# Patient Record
Sex: FEMALE | Race: WHITE | NOT HISPANIC OR LATINO | Employment: FULL TIME | URBAN - METROPOLITAN AREA
[De-identification: names, ages, dates, MRNs, and addresses within clinical notes are randomized per-mention and may not be internally consistent; named-entity substitution may affect disease eponyms.]

---

## 2017-01-16 ENCOUNTER — OFFICE VISIT (OUTPATIENT)
Dept: URGENT CARE | Facility: CLINIC | Age: 32
End: 2017-01-16
Payer: COMMERCIAL

## 2017-01-16 VITALS
BODY MASS INDEX: 23.32 KG/M2 | WEIGHT: 140 LBS | HEIGHT: 65 IN | TEMPERATURE: 98 F | SYSTOLIC BLOOD PRESSURE: 120 MMHG | RESPIRATION RATE: 12 BRPM | OXYGEN SATURATION: 98 % | DIASTOLIC BLOOD PRESSURE: 82 MMHG | HEART RATE: 112 BPM

## 2017-01-16 DIAGNOSIS — J02.9 ACUTE PHARYNGITIS, UNSPECIFIED ETIOLOGY: ICD-10-CM

## 2017-01-16 LAB
FLUAV+FLUBV AG SPEC QL IA: NORMAL
INT CON NEG: NEGATIVE
INT CON NEG: NEGATIVE
INT CON POS: POSITIVE
INT CON POS: POSITIVE
S PYO AG THROAT QL: NORMAL

## 2017-01-16 PROCEDURE — 87804 INFLUENZA ASSAY W/OPTIC: CPT | Performed by: NURSE PRACTITIONER

## 2017-01-16 PROCEDURE — 99214 OFFICE O/P EST MOD 30 MIN: CPT | Performed by: NURSE PRACTITIONER

## 2017-01-16 PROCEDURE — 87880 STREP A ASSAY W/OPTIC: CPT | Performed by: NURSE PRACTITIONER

## 2017-01-16 RX ORDER — AMOXICILLIN 500 MG/1
500 CAPSULE ORAL 3 TIMES DAILY
Qty: 30 CAP | Refills: 0 | Status: SHIPPED | OUTPATIENT
Start: 2017-01-16 | End: 2019-10-02

## 2017-01-16 ASSESSMENT — ENCOUNTER SYMPTOMS
WEAKNESS: 1
DIARRHEA: 0
HEADACHES: 1
MYALGIAS: 1
SPUTUM PRODUCTION: 0
COUGH: 0
TROUBLE SWALLOWING: 1
SORE THROAT: 1
NAUSEA: 0
CHILLS: 0
FEVER: 1
SHORTNESS OF BREATH: 0
SWOLLEN GLANDS: 1
VOMITING: 0

## 2017-01-16 NOTE — PROGRESS NOTES
"Subjective:      Malia Delgado is a 31 y.o. female who presents with Headache            HPI Comments: Medications, Allergies and Prior Medical Hx reviewed and updated in [facility].with patient/family today         Pharyngitis   This is a new problem. The current episode started yesterday. The problem has been unchanged. The maximum temperature recorded prior to her arrival was 100.4 - 100.9 F. The pain is at a severity of 5/10. Associated symptoms include headaches, swollen glands and trouble swallowing. Pertinent negatives include no congestion, coughing, diarrhea, ear discharge, ear pain, shortness of breath or vomiting. She has had no exposure to strep. She has tried NSAIDs for the symptoms. The treatment provided no relief.       Review of Systems   Constitutional: Positive for fever and malaise/fatigue. Negative for chills.   HENT: Positive for sore throat and trouble swallowing. Negative for congestion, ear discharge and ear pain.    Respiratory: Negative for cough, sputum production and shortness of breath.    Gastrointestinal: Negative for nausea, vomiting and diarrhea.   Musculoskeletal: Positive for myalgias.   Neurological: Positive for weakness and headaches.          Objective:     /82 mmHg  Pulse 112  Temp(Src) 36.7 °C (98 °F)  Resp 12  Ht 1.651 m (5' 5\")  Wt 63.504 kg (140 lb)  BMI 23.30 kg/m2  SpO2 98%  Breastfeeding? No     Physical Exam   Constitutional: She appears well-developed and well-nourished. No distress.   HENT:   Head: Normocephalic and atraumatic.   Right Ear: Tympanic membrane and ear canal normal.   Left Ear: Tympanic membrane and ear canal normal.   Nose: No rhinorrhea.   Mouth/Throat: Uvula is midline and mucous membranes are normal. Posterior oropharyngeal edema and posterior oropharyngeal erythema present. No oropharyngeal exudate.   Eyes: Conjunctivae are normal. Pupils are equal, round, and reactive to light.   Neck: Neck supple.   Cardiovascular: Normal rate, regular " rhythm and normal heart sounds.    Pulmonary/Chest: Effort normal and breath sounds normal. No respiratory distress. She has no wheezes.   Lymphadenopathy:     She has cervical adenopathy.   Neurological: She is alert.   Skin: Skin is warm and dry.   Psychiatric: She has a normal mood and affect. Her behavior is normal.   Vitals reviewed.              Assessment/Plan:     1. Acute pharyngitis, unspecified etiology  POCT Influenza A/B    POCT Rapid Strep A    amoxicillin (AMOXIL) 500 MG Cap       poct flu - neg  poct strep - neg    Discussed with the patient that her symptoms are compatible with strep, stated that antibiotics would be a reasonable choice would could not guarantee that they would be effective. She states she would like to try antibiotics.    Rest, Fluids, tylenol, ibuprofen, otc throat lozenges, gargle with warm salt water,   Pt will go to the ER for worsening or changing symptoms as discussed,  Follow-up with your primary care provider or return here if not improving in 5 days   Discharge instructions discussed with pt/family who verbalize understanding and agreement with poc

## 2017-01-16 NOTE — PATIENT INSTRUCTIONS

## 2017-01-16 NOTE — MR AVS SNAPSHOT
"        Malia Sandy   2017 1:00 PM   Office Visit   MRN: [id number]    Department:  [facility]   Dept Phone:  [phone]    Description:  Female : 1985   Provider:  YESY Esposito           Reason for Visit     Headache off and on, sore throat, sinus pressure, fatigue, body aches       Allergies as of 2017     No Known Allergies      You were diagnosed with     Acute pharyngitis, unspecified etiology   [[id number]]         Vital Signs     Blood Pressure Pulse Temperature Respirations Height Weight    120/82 mmHg 112 36.7 °C (98 °F) 12 1.651 m (5' 5\") 63.504 kg (140 lb)    Body Mass Index Oxygen Saturation Breastfeeding? Smoking Status          23.30 kg/m2 98% No Never Smoker         Basic Information     Date Of Birth Sex Race Ethnicity Preferred Language    1985 Female White Non- English      Health Maintenance        Date Due Completion Dates    IMM DTaP/Tdap/Td Vaccine (1 - Tdap) 2006 ---    PAP SMEAR 2008 ---    IMM INFLUENZA (1) 2016 ---            Current Immunizations     No immunizations on file.      Below and/or attached are the medications your provider expects you to take. Review all of your home medications and newly ordered medications with your provider and/or pharmacist. Follow medication instructions as directed by your provider and/or pharmacist. Please keep your medication list with you and share with your provider. Update the information when medications are discontinued, doses are changed, or new medications (including over-the-counter products) are added; and carry medication information at all times in the event of emergency situations     Allergies:  No Known Allergies          Medications  Valid as of: 2017 -  1:38 PM    Generic Name Brand Name Tablet Size Instructions for use    Amoxicillin (Cap) AMOXIL 500 MG Take 1 Cap by mouth 3 times a day.        Amoxicillin-Pot Clavulanate (Tab) AUGMENTIN 875-125 MG Take 1 Tab by " mouth 2 times a day.        Amoxicillin-Pot Clavulanate (Tab) AUGMENTIN 875-125 MG Take 1 Tab by mouth 2 times a day.        .                 Medicines prescribed today were sent to:     [facility] [place], [place] - [address]    [address] [place] [address]    Phone: [phone] Fax: [phone]    Open 24 Hours?: No      Medication refill instructions:       If your prescription bottle indicates you have medication refills left, it is not necessary to call your provider’s office. Please contact your pharmacy and they will refill your medication.    If your prescription bottle indicates you do not have any refills left, you may request refills at any time through one of the following ways: The online Pharmap[employer] system (except Urgent Care), by calling your provider’s office, or by asking your pharmacy to contact your provider’s office with a refill request. Medication refills are processed only during regular business hours and may not be available until the next business day. Your provider may request additional information or to have a follow-up visit with you prior to refilling your medication.   *Please Note: Medication refills are assigned a new Rx number when refilled electronically. Your pharmacy may indicate that no refills were authorized even though a new prescription for the same medication is available at the pharmacy. Please request the medicine by name with the pharmacy before contacting your provider for a refill.        Instructions    Pharyngitis  Pharyngitis is redness, pain, and swelling (inflammation) of your pharynx.   CAUSES   Pharyngitis is usually caused by infection. Most of the time, these infections are from viruses (viral) and are part of a cold. However, sometimes pharyngitis is caused by bacteria (bacterial). Pharyngitis can also be caused by allergies. Viral pharyngitis may be spread from person to person by coughing, sneezing, and personal items or utensils  (cups, forks, spoons, toothbrushes). Bacterial pharyngitis may be spread from person to person by more intimate contact, such as kissing.   SIGNS AND SYMPTOMS   Symptoms of pharyngitis include:    · Sore throat.    · Tiredness (fatigue).    · Low-grade fever.    · Headache.  · Joint pain and muscle aches.  · Skin rashes.  · Swollen lymph nodes.  · Plaque-like film on throat or tonsils (often seen with bacterial pharyngitis).  DIAGNOSIS   Your health care provider will ask you questions about your illness and your symptoms. Your medical history, along with a physical exam, is often all that is needed to diagnose pharyngitis. Sometimes, a rapid strep test is done. Other lab tests may also be done, depending on the suspected cause.   TREATMENT   Viral pharyngitis will usually get better in 3-4 days without the use of medicine. Bacterial pharyngitis is treated with medicines that kill germs (antibiotics).   HOME CARE INSTRUCTIONS   · Drink enough water and fluids to keep your urine clear or pale yellow.    · Only take over-the-counter or prescription medicines as directed by your health care provider:    ¨ If you are prescribed antibiotics, make sure you finish them even if you start to feel better.    ¨ Do not take aspirin.    · Get lots of rest.    · Gargle with 8 oz of salt water (½ tsp of salt per 1 qt of water) as often as every 1-2 hours to soothe your throat.    · Throat lozenges (if you are not at risk for choking) or sprays may be used to soothe your throat.  SEEK MEDICAL CARE IF:   · You have large, tender lumps in your neck.  · You have a rash.  · You cough up green, yellow-brown, or bloody spit.  SEEK IMMEDIATE MEDICAL CARE IF:   · Your neck becomes stiff.  · You drool or are unable to swallow liquids.  · You vomit or are unable to keep medicines or liquids down.  · You have severe pain that does not go away with the use of recommended medicines.  · You have trouble breathing (not caused by a stuffy  nose).  MAKE SURE YOU:   · Understand these instructions.  · Will watch your condition.  · Will get help right away if you are not doing well or get worse.     This information is not intended to replace advice given to you by your health care provider. Make sure you discuss any questions you have with your health care provider.     Document Released: 12/18/2006 Document Revised: 10/08/2014 Document Reviewed: 08/25/2014  [employer] Interactive Patient Education ©2016 [employer] Inc.            "[employer] Access Code: [email or web address]  Expires: 2/15/2017  1:38 PM    Your email address is not on file at [facility].  Email Addresses are required for you to sign up for "[employer] please contact [phone] to verify your personal information and to provide your email address prior to attempting to register for "[employer].    Malia Delgado  [address] [place] [address]    "[employer]  A secure, online tool to manage your health information     [employer]’s "[employer]® is a secure, online tool that connects you to your personalized health information from the privacy of your home -- day or night - making it very easy for you to manage your healthcare. Once the activation process is completed, you can even access your medical information using the "[employer] ayesha, which is available for free in the Apple Ayesha store or Google Play store.     To learn more about "[employer] visit [email or web address]/"[employer]    There are two levels of access available (as shown below):   My Chart Features  [facility] Primary Care Doctor [facility]  Specialists [facility]  Urgent  Care Non-[facility] Primary Care Doctor   Email your healthcare team securely and privately 24/7 X X X    Manage appointments: schedule your next appointment; view details of past/upcoming appointments X      Request prescription refills. X      View recent personal medical records, including lab and immunizations X X X X   View health record, including health history, allergies, medications X  X X X   Read reports about your outpatient visits, procedures, consult and ER notes X X X X   See your discharge summary, which is a recap of your hospital and/or ER visit that includes your diagnosis, lab results, and care plan X X  X     How to register for [employer]:  Once your e-mail address has been verified, follow the following steps to sign up for [employer].     1. Go to  [email or web address]  2. Click on the Sign Up Now box, which takes you to the New Member Sign Up page. You will need to provide the following information:  a. Enter your [employer] Access Code exactly as it appears at the top of this page. (You will not need to use this code after you’ve completed the sign-up process. If you do not sign up before the expiration date, you must request a new code.)   b. Enter your date of birth.   c. Enter your home email address.   d. Click Submit, and follow the next screen’s instructions.  3. Create a [employer] ID. This will be your [employer] login ID and cannot be changed, so think of one that is secure and easy to remember.  4. Create a [employer] password. You can change your password at any time.  5. Enter your Password Reset Question and Answer. This can be used at a later time if you forget your password.   6. Enter your e-mail address. This allows you to receive e-mail notifications when new information is available in [employer].  7. Click Sign Up. You can now view your health information.    For assistance activating your [employer] account, call [phone]

## 2017-01-16 NOTE — Clinical Note
January 16, 2017       Patient: Malia Delgado   YOB: 1985   Date of Visit: 1/16/2017         To Whom It May Concern:    It is my medical opinion that Malia Delgado should be off work for 2-3 days due to illness.     If you have any questions or concerns, please don't hesitate to call [phone]          Sincerely,          HALIE Esposito.  Electronically Signed

## 2017-01-22 ENCOUNTER — OFFICE VISIT (OUTPATIENT)
Dept: URGENT CARE | Facility: CLINIC | Age: 32
End: 2017-01-22
Payer: COMMERCIAL

## 2017-01-22 VITALS
HEIGHT: 65 IN | WEIGHT: 152 LBS | RESPIRATION RATE: 16 BRPM | BODY MASS INDEX: 25.33 KG/M2 | DIASTOLIC BLOOD PRESSURE: 76 MMHG | HEART RATE: 96 BPM | SYSTOLIC BLOOD PRESSURE: 122 MMHG | OXYGEN SATURATION: 97 % | TEMPERATURE: 99 F

## 2017-01-22 DIAGNOSIS — B30.9 ACUTE VIRAL CONJUNCTIVITIS OF LEFT EYE: ICD-10-CM

## 2017-01-22 DIAGNOSIS — M79.671 FOOT PAIN, RIGHT: ICD-10-CM

## 2017-01-22 PROCEDURE — 99214 OFFICE O/P EST MOD 30 MIN: CPT | Performed by: PHYSICIAN ASSISTANT

## 2017-01-22 RX ORDER — POLYMYXIN B SULFATE AND TRIMETHOPRIM 1; 10000 MG/ML; [USP'U]/ML
2 SOLUTION OPHTHALMIC EVERY 4 HOURS
Qty: 1 BOTTLE | Refills: 0 | Status: SHIPPED | OUTPATIENT
Start: 2017-01-22 | End: 2017-01-27

## 2017-01-22 ASSESSMENT — ENCOUNTER SYMPTOMS
MYALGIAS: 0
EYE PAIN: 1
COUGH: 0
EYE REDNESS: 1
VOMITING: 0
FEVER: 0
PHOTOPHOBIA: 0
EYE DISCHARGE: 1
HEADACHES: 0
DIZZINESS: 0
CHILLS: 0
DIARRHEA: 0
BLURRED VISION: 1

## 2017-01-22 NOTE — PROGRESS NOTES
"Subjective:      Malia Delgado is a 31 y.o. female who presents with Foot Problem and Eye Problem    Current medications reviewed.  No past medical history on file.  Social History   Substance Use Topics   • Smoking status: Never Smoker    • Smokeless tobacco: Not on file   • Alcohol Use: Not on file     Family History Reviewed: noncontributory      Left eye became red with drainage in middle of night, denies blurred vision.     3 months with intermittent right foot pain at MCP joint with enlargement in this area.    Foot Problem  Pertinent negatives include no chills, congestion, coughing, fever, headaches, myalgias, rash or vomiting.   Eye Problem   Associated symptoms include blurred vision, an eye discharge and eye redness. Pertinent negatives include no fever, photophobia or vomiting.       Review of Systems   Constitutional: Negative for fever, chills and malaise/fatigue.   HENT: Negative for congestion and ear pain.    Eyes: Positive for blurred vision, pain, discharge and redness. Negative for photophobia.   Respiratory: Negative for cough.    Gastrointestinal: Negative for vomiting and diarrhea.   Musculoskeletal: Negative for myalgias.        Chronic right foot pain distal lateral   Skin: Negative for rash.   Neurological: Negative for dizziness and headaches.   All other systems reviewed and are negative.         Objective:     /76 mmHg  Pulse 96  Temp(Src) 37.2 °C (99 °F)  Resp 16  Ht 1.651 m (5' 5\")  Wt 68.947 kg (152 lb)  BMI 25.29 kg/m2  SpO2 97%     Physical Exam   Constitutional: She is oriented to person, place, and time. She appears well-developed and well-nourished.   HENT:   Right Ear: Tympanic membrane and ear canal normal.   Left Ear: Tympanic membrane and ear canal normal.   Nose: Mucosal edema present.   Eyes: EOM and lids are normal. Pupils are equal, round, and reactive to light. Right eye exhibits discharge. Left eye exhibits discharge. Right conjunctiva is injected. Left " conjunctiva is injected.   Cardiovascular: Normal rate and regular rhythm.    Pulmonary/Chest: Effort normal and breath sounds normal.   Musculoskeletal:        Right foot: There is tenderness. There is normal range of motion, no bony tenderness, no swelling, normal capillary refill, no crepitus, no deformity and no laceration.        Feet:    Mild callus formation on dermis with enlargement of dermis and possible enlargement of bony structure at distal fifth metatarsal.  No erythema, warmth, laceration, ecchymosis, fluctuation noted.   Lymphadenopathy:     She has no cervical adenopathy.   Neurological: She is alert and oriented to person, place, and time.   Skin: Skin is warm and dry.   Nursing note and vitals reviewed.              Assessment/Plan:     1. Foot pain, right  polymixin-trimethoprim (POLYTRIM) 70541-6.1 UNIT/ML-% Solution    REFERRAL TO PODIATRY   2. Acute viral conjunctivitis of left eye  polymixin-trimethoprim (POLYTRIM) 95557-7.1 UNIT/ML-% Solution    REFERRAL TO PODIATRY     Podiatry referral placed to have right foot evaluated. 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  Wash hands before and after touching eyes.  Use medication for 5-7 days  Follow up for failure of sx to resolve or with any questions or concerns.  Patient verbalized understanding of and agreed with plan of care.

## 2017-01-22 NOTE — MR AVS SNAPSHOT
"        Malia Acevedol   2017 12:30 PM   Office Visit   MRN: [id number]    Department:  [facility]   Dept Phone:  [phone]    Description:  Female : 1985   Provider:  Ermelinda Miranda PA-C           Reason for Visit     Foot Problem Right foot lump/base of fith metatarsal x3 months     Eye Problem Left eye redness/drainage x1 day       Allergies as of 2017     No Known Allergies      You were diagnosed with     Foot pain, right   [[id number]]       Acute viral conjunctivitis of left eye   [[id number]]         Vital Signs     Blood Pressure Pulse Temperature Respirations Height Weight    122/76 mmHg 96 37.2 °C (99 °F) 16 1.651 m (5' 5\") 68.947 kg (152 lb)    Body Mass Index Oxygen Saturation Smoking Status             25.29 kg/m2 97% Never Smoker          Basic Information     Date Of Birth Sex Race Ethnicity Preferred Language    1985 Female White Non- English      Health Maintenance        Date Due Completion Dates    IMM DTaP/Tdap/Td Vaccine (1 - Tdap) 2006 ---    PAP SMEAR 2008 ---    IMM INFLUENZA (1) 2016 ---            Current Immunizations     No immunizations on file.      Below and/or attached are the medications your provider expects you to take. Review all of your home medications and newly ordered medications with your provider and/or pharmacist. Follow medication instructions as directed by your provider and/or pharmacist. Please keep your medication list with you and share with your provider. Update the information when medications are discontinued, doses are changed, or new medications (including over-the-counter products) are added; and carry medication information at all times in the event of emergency situations     Allergies:  No Known Allergies          Medications  Valid as of: 2017 -  3:35 PM    Generic Name Brand Name Tablet Size Instructions for use    Amoxicillin (Cap) AMOXIL 500 MG Take 1 Cap by mouth 3 times a day.       " Amoxicillin-Pot Clavulanate (Tab) AUGMENTIN 875-125 MG Take 1 Tab by mouth 2 times a day.        Amoxicillin-Pot Clavulanate (Tab) AUGMENTIN 875-125 MG Take 1 Tab by mouth 2 times a day.        Polymyxin B-Trimethoprim (Solution) POLYTRIM 47403-5.1 UNIT/ML-% Place 2 Drops in both eyes every 4 hours for 5 days.        .                 Medicines prescribed today were sent to:     [facility] [address] [place] [address], [place] - [address]    [address] [place] [address]    Phone: [phone] Fax: [phone]    Open 24 Hours?: No      Medication refill instructions:       If your prescription bottle indicates you have medication refills left, it is not necessary to call your provider’s office. Please contact your pharmacy and they will refill your medication.    If your prescription bottle indicates you do not have any refills left, you may request refills at any time through one of the following ways: The online [employer] system (except Urgent Care), by calling your provider’s office, or by asking your pharmacy to contact your provider’s office with a refill request. Medication refills are processed only during regular business hours and may not be available until the next business day. Your provider may request additional information or to have a follow-up visit with you prior to refilling your medication.   *Please Note: Medication refills are assigned a new Rx number when refilled electronically. Your pharmacy may indicate that no refills were authorized even though a new prescription for the same medication is available at the pharmacy. Please request the medicine by name with the pharmacy before contacting your provider for a refill.        Referral     A referral request has been sent to our patient care coordination department. Please allow 3-5 business days for us to process this request and contact you either by phone or mail. If you do not hear from us by the 5th business day, please call us at  [phone].           Silent Communication Access Code: [email or web address]  Expires: 2/15/2017  1:38 PM    Your email address is not on file at [facility].  Email Addresses are required for you to sign up for Silent Communication, please contact [phone] to verify your personal information and to provide your email address prior to attempting to register for Silent Communication.    Malia Delgado  [address] [place] [address], [place] [address]    Silent Communication  A secure, online tool to manage your health information     [facility]’s Silent Communication® is a secure, online tool that connects you to your personalized health information from the privacy of your home -- day or night - making it very easy for you to manage your healthcare. Once the activation process is completed, you can even access your medical information using the Silent Communication ayesha, which is available for free in the Apple Ayesha store or Google Play store.     To learn more about Silent Communication, visit [email or web address]    There are two levels of access available (as shown below):   My Chart Features  [facility] Primary Care Doctor [facility]  Specialists [facility]  Urgent  Care Non-[facility] Primary Care Doctor   Email your healthcare team securely and privately 24/7 X X X    Manage appointments: schedule your next appointment; view details of past/upcoming appointments X      Request prescription refills. X      View recent personal medical records, including lab and immunizations X X X X   View health record, including health history, allergies, medications X X X X   Read reports about your outpatient visits, procedures, consult and ER notes X X X X   See your discharge summary, which is a recap of your hospital and/or ER visit that includes your diagnosis, lab results, and care plan X X  X     How to register for Silent Communication:  Once your e-mail address has been verified, follow the following steps to sign up for Silent Communication.     1. Go to  [email or web address]  2. Click on the Sign Up Now box, which takes you to the New  Member Sign Up page. You will need to provide the following information:  a. Enter your [employer] Access Code exactly as it appears at the top of this page. (You will not need to use this code after you’ve completed the sign-up process. If you do not sign up before the expiration date, you must request a new code.)   b. Enter your date of birth.   c. Enter your home email address.   d. Click Submit, and follow the next screen’s instructions.  3. Create a Identifiedt ID. This will be your [employer] login ID and cannot be changed, so think of one that is secure and easy to remember.  4. Create a [employer] password. You can change your password at any time.  5. Enter your Password Reset Question and Answer. This can be used at a later time if you forget your password.   6. Enter your e-mail address. This allows you to receive e-mail notifications when new information is available in [employer].  7. Click Sign Up. You can now view your health information.    For assistance activating your [employer] account, call [phone]

## 2017-01-22 NOTE — Clinical Note
January 22, 2017         Patient: Malia Delgado   YOB: 1985   Date of Visit: 1/22/2017           To Whom it May Concern:    Malia Delgado was seen in my clinic on 1/22/2017. She has pink eye and may need to work from home the beginning of this week.    If you have any questions or concerns, please don't hesitate to call.        Sincerely,           Ermelinda Miranda PA-C  Electronically Signed

## 2017-10-20 ENCOUNTER — HOSPITAL ENCOUNTER (OUTPATIENT)
Facility: MEDICAL CENTER | Age: 32
End: 2017-10-20
Payer: COMMERCIAL

## 2017-10-20 LAB
ALBUMIN SERPL BCP-MCNC: 4.4 G/DL (ref 3.2–4.9)
ALBUMIN/GLOB SERPL: 1.8 G/DL
ALP SERPL-CCNC: 49 U/L (ref 30–99)
ALT SERPL-CCNC: 15 U/L (ref 2–50)
ANION GAP SERPL CALC-SCNC: 6 MMOL/L (ref 0–11.9)
AST SERPL-CCNC: 18 U/L (ref 12–45)
BILIRUB SERPL-MCNC: 0.5 MG/DL (ref 0.1–1.5)
BUN SERPL-MCNC: 10 MG/DL (ref 8–22)
CALCIUM SERPL-MCNC: 9.4 MG/DL (ref 8.5–10.5)
CHLORIDE SERPL-SCNC: 105 MMOL/L (ref 96–112)
CHOLEST SERPL-MCNC: 142 MG/DL (ref 100–199)
CO2 SERPL-SCNC: 24 MMOL/L (ref 20–33)
CREAT SERPL-MCNC: 0.83 MG/DL (ref 0.5–1.4)
GFR SERPL CREATININE-BSD FRML MDRD: >60 ML/MIN/1.73 M 2
GLOBULIN SER CALC-MCNC: 2.4 G/DL (ref 1.9–3.5)
GLUCOSE SERPL-MCNC: 86 MG/DL (ref 65–99)
HDLC SERPL-MCNC: 57 MG/DL
LDLC SERPL CALC-MCNC: 76 MG/DL
POTASSIUM SERPL-SCNC: 4.1 MMOL/L (ref 3.6–5.5)
PROT SERPL-MCNC: 6.8 G/DL (ref 6–8.2)
SODIUM SERPL-SCNC: 135 MMOL/L (ref 135–145)
TRIGL SERPL-MCNC: 47 MG/DL (ref 0–149)

## 2018-05-11 ENCOUNTER — OFFICE VISIT (OUTPATIENT)
Dept: URGENT CARE | Facility: CLINIC | Age: 33
End: 2018-05-11
Payer: COMMERCIAL

## 2018-05-11 ENCOUNTER — HOSPITAL ENCOUNTER (OUTPATIENT)
Dept: RADIOLOGY | Facility: MEDICAL CENTER | Age: 33
End: 2018-05-11
Attending: PHYSICIAN ASSISTANT
Payer: COMMERCIAL

## 2018-05-11 VITALS
OXYGEN SATURATION: 97 % | BODY MASS INDEX: 25.62 KG/M2 | HEIGHT: 65 IN | TEMPERATURE: 98.7 F | SYSTOLIC BLOOD PRESSURE: 124 MMHG | WEIGHT: 153.8 LBS | DIASTOLIC BLOOD PRESSURE: 74 MMHG | HEART RATE: 104 BPM | RESPIRATION RATE: 18 BRPM

## 2018-05-11 DIAGNOSIS — M79.89 LEG SWELLING: ICD-10-CM

## 2018-05-11 PROCEDURE — 93971 EXTREMITY STUDY: CPT | Mod: LT

## 2018-05-11 PROCEDURE — 99214 OFFICE O/P EST MOD 30 MIN: CPT | Performed by: PHYSICIAN ASSISTANT

## 2018-05-11 ASSESSMENT — PAIN SCALES - GENERAL: PAINLEVEL: 7=MODERATE-SEVERE PAIN

## 2018-05-11 ASSESSMENT — ENCOUNTER SYMPTOMS
TINGLING: 0
CHILLS: 0
FOCAL WEAKNESS: 0
DOUBLE VISION: 0
SEIZURES: 0
COUGH: 0
SENSORY CHANGE: 0
TREMORS: 0
HEADACHES: 0
DIZZINESS: 0
PALPITATIONS: 0
LOSS OF CONSCIOUSNESS: 0
SPEECH CHANGE: 0
BLURRED VISION: 0
SHORTNESS OF BREATH: 0
FEVER: 0

## 2018-05-11 NOTE — PROGRESS NOTES
"Subjective:      Malia Delgado is a 32 y.o. female who presents with Knee Injury (swollen back of knee, L, began x 5 days )            Knee Injury   This is a new problem. The current episode started in the past 7 days (no mechanism of injury). The problem occurs constantly. The problem has been unchanged. Pertinent negatives include no chest pain, chills, coughing, fever, headaches or rash. Associated symptoms comments: Swelling behind the left knee  . The symptoms are aggravated by walking. She has tried nothing for the symptoms.       Review of Systems   Constitutional: Negative for chills and fever.   Eyes: Negative for blurred vision and double vision.   Respiratory: Negative for cough and shortness of breath.    Cardiovascular: Negative for chest pain and palpitations.   Musculoskeletal:        Swelling behind left knee     Skin: Negative for rash.   Neurological: Negative for dizziness, tingling, tremors, sensory change, speech change, focal weakness, seizures, loss of consciousness and headaches.   All other systems reviewed and are negative.    PMH:  has no past medical history on file.  MEDS:   Current Outpatient Prescriptions:   •  amoxicillin (AMOXIL) 500 MG Cap, Take 1 Cap by mouth 3 times a day., Disp: 30 Cap, Rfl: 0  •  amoxicillin-clavulanate (AUGMENTIN) 875-125 MG Tab, Take 1 Tab by mouth 2 times a day., Disp: 20 Tab, Rfl: 0  •  amoxicillin-clavulanate (AUGMENTIN) 875-125 MG Tab, Take 1 Tab by mouth 2 times a day., Disp: 20 Tab, Rfl: 0  ALLERGIES: No Known Allergies  SURGHX: History reviewed. No pertinent surgical history.  SOCHX:  reports that she has never smoked. She has never used smokeless tobacco.  FH: Family history was reviewed, no pertinent findings to report  Medications, Allergies, and current problem list reviewed today in Epic       Objective:     /74   Pulse (!) 104   Temp 37.1 °C (98.7 °F)   Resp 18   Ht 1.651 m (5' 5\")   Wt 69.8 kg (153 lb 12.8 oz)   SpO2 97%   " Breastfeeding? No   BMI 25.59 kg/m²      Physical Exam   Constitutional: She is oriented to person, place, and time. She appears well-developed and well-nourished.   Cardiovascular: Normal rate, regular rhythm, normal heart sounds, intact distal pulses and normal pulses.    Pulmonary/Chest: Effort normal and breath sounds normal.   Musculoskeletal: Normal range of motion. She exhibits tenderness. She exhibits no edema or deformity.   Palpable swelling of left knee.  Neg Homans sign.  No edema in leg.   Neurological: She is alert and oriented to person, place, and time. She has normal reflexes. She displays normal reflexes. She exhibits normal muscle tone. Coordination normal.   Skin: Skin is warm and dry.   Psychiatric: She has a normal mood and affect. Her behavior is normal. Judgment and thought content normal.   Vitals reviewed.              5/11/2018 2:33 PM    HISTORY/REASON FOR EXAM:  Left lower extremity swelling and pain.      TECHNIQUE/EXAM DESCRIPTION: Left lower extremity doppler venous ultrasound was performed. Using both color and pulse-wave Doppler imaging, multiple images were obtained from the common femoral vein origins distally through the popliteal trifurcation.    COMPARISON:  None.    FINDINGS:  Graded compression was used to demonstrate patent lumens.  There is no evidence for luminal thrombus.  There is normal response to augmentation and respiratory variation.    There are no abnormal fluid collections.   Impression       1.  No evidence of Left  lower extremity deep venous thrombosis.    2.  There are no incidental findings.       Assessment/Plan:   Patient is a 32-year-old female who presents with some evidence of left leg pain for 8 days. She denies any trauma to the area. She is experiencing some swelling behind the left knee. No previous history of DVT. She is not on birth control or currently smoking. Vital signs show mild tachycardia. There is no edema in the leg. Ultrasound of the  leg shows no thrombus or cyst.  Most likely muscular in etiology.    1. Leg swelling  - RICE Therapy  - US-EXTREMITY VENOUS UNILATERAL-LOWER LEFT; Future    Differential diagnosis, natural history, supportive care discussed. Follow-up with primary care provider within 7-10 days, emergency room precautions discussed.  Patient and/or family appears understanding of information.

## 2018-11-14 ENCOUNTER — HOSPITAL ENCOUNTER (OUTPATIENT)
Facility: MEDICAL CENTER | Age: 33
End: 2018-11-14
Payer: COMMERCIAL

## 2018-11-15 LAB
CHOLEST SERPL-MCNC: 150 MG/DL (ref 100–199)
FASTING STATUS PATIENT QL REPORTED: NORMAL
GLUCOSE SERPL-MCNC: 85 MG/DL (ref 65–99)
HDLC SERPL-MCNC: 59 MG/DL
LDLC SERPL CALC-MCNC: 80 MG/DL
TRIGL SERPL-MCNC: 57 MG/DL (ref 0–149)

## 2019-05-23 ENCOUNTER — HOSPITAL ENCOUNTER (OUTPATIENT)
Dept: RADIOLOGY | Facility: MEDICAL CENTER | Age: 34
End: 2019-05-23
Attending: INTERNAL MEDICINE
Payer: COMMERCIAL

## 2019-05-23 DIAGNOSIS — R10.13 ABDOMINAL PAIN, EPIGASTRIC: ICD-10-CM

## 2019-05-23 DIAGNOSIS — R19.7 DIARRHEA OF PRESUMED INFECTIOUS ORIGIN: ICD-10-CM

## 2019-05-23 PROCEDURE — 76700 US EXAM ABDOM COMPLETE: CPT

## 2019-10-02 ENCOUNTER — OFFICE VISIT (OUTPATIENT)
Dept: URGENT CARE | Facility: CLINIC | Age: 34
End: 2019-10-02
Payer: COMMERCIAL

## 2019-10-02 VITALS
HEART RATE: 81 BPM | WEIGHT: 153 LBS | DIASTOLIC BLOOD PRESSURE: 76 MMHG | RESPIRATION RATE: 16 BRPM | TEMPERATURE: 97.6 F | SYSTOLIC BLOOD PRESSURE: 112 MMHG | OXYGEN SATURATION: 100 % | BODY MASS INDEX: 25.46 KG/M2

## 2019-10-02 DIAGNOSIS — J01.40 ACUTE PANSINUSITIS, RECURRENCE NOT SPECIFIED: ICD-10-CM

## 2019-10-02 PROCEDURE — 99214 OFFICE O/P EST MOD 30 MIN: CPT | Performed by: NURSE PRACTITIONER

## 2019-10-02 RX ORDER — FLUTICASONE PROPIONATE 50 MCG
2 SPRAY, SUSPENSION (ML) NASAL DAILY
Qty: 1 BOTTLE | Refills: 0 | Status: ON HOLD | OUTPATIENT
Start: 2019-10-02 | End: 2019-12-16

## 2019-10-02 RX ORDER — AMOXICILLIN AND CLAVULANATE POTASSIUM 875; 125 MG/1; MG/1
1 TABLET, FILM COATED ORAL 2 TIMES DAILY
Qty: 20 TAB | Refills: 0 | Status: SHIPPED | OUTPATIENT
Start: 2019-10-02 | End: 2019-10-12

## 2019-10-02 ASSESSMENT — ENCOUNTER SYMPTOMS
SORE THROAT: 1
SHORTNESS OF BREATH: 0
SWOLLEN GLANDS: 0
SINUS PRESSURE: 1
HOARSE VOICE: 1
COUGH: 1
NECK PAIN: 0
DIAPHORESIS: 0
HEADACHES: 1
CHILLS: 1

## 2019-10-02 NOTE — PROGRESS NOTES
Subjective:      Malia Delgado is a 34 y.o. female who presents with Nasal Congestion (sinus pressure, cough x11 days )        Reviewed past medical, surgical and family history. Reviewed prescription and OTC medications with patient in electronic health record today      No Known Allergies    Sinusitis   This is a new problem. Episode onset: 11 days  The problem has been rapidly worsening since onset. Maximum temperature: subjective. Her pain is at a severity of 6/10. The pain is moderate. Associated symptoms include chills, congestion, coughing, headaches, a hoarse voice, sinus pressure, sneezing and a sore throat. Pertinent negatives include no diaphoresis, ear pain, neck pain, shortness of breath or swollen glands. Past treatments include acetaminophen (allertec antihistamine). The treatment provided mild relief.       Review of Systems   Constitutional: Positive for chills. Negative for diaphoresis.   HENT: Positive for congestion, hoarse voice, sinus pressure, sneezing and sore throat. Negative for ear pain.    Respiratory: Positive for cough. Negative for shortness of breath.    Musculoskeletal: Negative for neck pain.   Neurological: Positive for headaches.          Objective:     /76   Pulse 81   Temp 36.4 °C (97.6 °F) (Temporal)   Resp 16   Wt 69.4 kg (153 lb)   SpO2 100%   BMI 25.46 kg/m²      Physical Exam   Constitutional: She is oriented to person, place, and time. She appears well-developed and well-nourished. No distress.   HENT:   Head: Normocephalic.   Right Ear: Hearing, tympanic membrane, external ear and ear canal normal.   Left Ear: Hearing, tympanic membrane, external ear and ear canal normal.   Nose: Sinus tenderness present. No mucosal edema or rhinorrhea. Right sinus exhibits frontal sinus tenderness. Right sinus exhibits no maxillary sinus tenderness. Left sinus exhibits frontal sinus tenderness. Left sinus exhibits no maxillary sinus tenderness.   Mouth/Throat: Uvula is midline.  Oropharyngeal exudate present.   Eyes: Pupils are equal, round, and reactive to light. Right conjunctiva is injected. Left conjunctiva is injected.   Neck: Trachea normal, normal range of motion, full passive range of motion without pain and phonation normal. Neck supple.   Cardiovascular: Normal rate, regular rhythm, intact distal pulses and normal pulses. PMI is not displaced.   Pulmonary/Chest: Effort normal and breath sounds normal.   Lymphadenopathy:     She has no cervical adenopathy.        Right: No supraclavicular adenopathy present.        Left: No supraclavicular adenopathy present.   Neurological: She is alert and oriented to person, place, and time. Gait normal.   Skin: Skin is warm and dry. She is not diaphoretic.   Psychiatric: She has a normal mood and affect. Her speech is normal and behavior is normal. Cognition and memory are normal.   Nursing note and vitals reviewed.              Assessment/Plan:     1. Acute pansinusitis, recurrence not specified    - amoxicillin-clavulanate (AUGMENTIN) 875-125 MG Tab; Take 1 Tab by mouth 2 times a day for 10 days.  Dispense: 20 Tab; Refill: 0  - fluticasone (FLONASE) 50 MCG/ACT nasal spray; Spray 2 Sprays in nose every day.  Dispense: 1 Bottle; Refill: 0    FU with PCP or return to Urgent Care in 5-7 days if no improvement in symptoms, sooner if increased or worsening symptoms.   Humidifier at noc may be beneficial.   OTC antihistamine of choice - non-drowsy. Take as directed by   Keep well hydrated

## 2019-10-02 NOTE — LETTER
October 2, 2019       Patient: Malia Delgado   YOB: 1985   Date of Visit: 10/2/2019         To Whom It May Concern:    It is my medical opinion that Malia Delgado return to full duty, no restrictions on 10/04/19.              Sincerely,          YESY Perez  Electronically Signed

## 2019-12-16 ENCOUNTER — HOSPITAL ENCOUNTER (INPATIENT)
Facility: MEDICAL CENTER | Age: 34
LOS: 1 days | DRG: 343 | End: 2019-12-16
Attending: EMERGENCY MEDICINE | Admitting: SURGERY
Payer: COMMERCIAL

## 2019-12-16 ENCOUNTER — ANESTHESIA (OUTPATIENT)
Dept: SURGERY | Facility: MEDICAL CENTER | Age: 34
DRG: 343 | End: 2019-12-16
Payer: COMMERCIAL

## 2019-12-16 ENCOUNTER — ANESTHESIA EVENT (OUTPATIENT)
Dept: SURGERY | Facility: MEDICAL CENTER | Age: 34
DRG: 343 | End: 2019-12-16
Payer: COMMERCIAL

## 2019-12-16 ENCOUNTER — APPOINTMENT (OUTPATIENT)
Dept: RADIOLOGY | Facility: MEDICAL CENTER | Age: 34
DRG: 343 | End: 2019-12-16
Attending: EMERGENCY MEDICINE
Payer: COMMERCIAL

## 2019-12-16 VITALS
HEART RATE: 86 BPM | HEIGHT: 65 IN | WEIGHT: 153.66 LBS | TEMPERATURE: 97.9 F | BODY MASS INDEX: 25.6 KG/M2 | RESPIRATION RATE: 16 BRPM | OXYGEN SATURATION: 96 % | SYSTOLIC BLOOD PRESSURE: 114 MMHG | DIASTOLIC BLOOD PRESSURE: 79 MMHG

## 2019-12-16 DIAGNOSIS — G89.18 POST-OP PAIN: ICD-10-CM

## 2019-12-16 DIAGNOSIS — K35.80 ACUTE APPENDICITIS, UNSPECIFIED ACUTE APPENDICITIS TYPE: ICD-10-CM

## 2019-12-16 LAB
ALBUMIN SERPL BCP-MCNC: 4.6 G/DL (ref 3.2–4.9)
ALBUMIN/GLOB SERPL: 1.7 G/DL
ALP SERPL-CCNC: 62 U/L (ref 30–99)
ALT SERPL-CCNC: 13 U/L (ref 2–50)
ANION GAP SERPL CALC-SCNC: 16 MMOL/L (ref 0–11.9)
APPEARANCE UR: CLEAR
AST SERPL-CCNC: 18 U/L (ref 12–45)
BACTERIA #/AREA URNS HPF: ABNORMAL /HPF
BASOPHILS # BLD AUTO: 0.7 % (ref 0–1.8)
BASOPHILS # BLD: 0.1 K/UL (ref 0–0.12)
BILIRUB SERPL-MCNC: 0.7 MG/DL (ref 0.1–1.5)
BILIRUB UR QL STRIP.AUTO: NEGATIVE
BUN SERPL-MCNC: 9 MG/DL (ref 8–22)
CALCIUM SERPL-MCNC: 10.1 MG/DL (ref 8.4–10.2)
CHLORIDE SERPL-SCNC: 99 MMOL/L (ref 96–112)
CO2 SERPL-SCNC: 21 MMOL/L (ref 20–33)
COLOR UR: YELLOW
CREAT SERPL-MCNC: 0.75 MG/DL (ref 0.5–1.4)
EKG IMPRESSION: NORMAL
EOSINOPHIL # BLD AUTO: 0.1 K/UL (ref 0–0.51)
EOSINOPHIL NFR BLD: 0.7 % (ref 0–6.9)
EPI CELLS #/AREA URNS HPF: ABNORMAL /HPF
ERYTHROCYTE [DISTWIDTH] IN BLOOD BY AUTOMATED COUNT: 36.7 FL (ref 35.9–50)
GLOBULIN SER CALC-MCNC: 2.7 G/DL (ref 1.9–3.5)
GLUCOSE SERPL-MCNC: 104 MG/DL (ref 65–99)
GLUCOSE UR STRIP.AUTO-MCNC: NEGATIVE MG/DL
HCG UR QL: NEGATIVE
HCT VFR BLD AUTO: 39.3 % (ref 37–47)
HGB BLD-MCNC: 12.8 G/DL (ref 12–16)
IMM GRANULOCYTES # BLD AUTO: 0.07 K/UL (ref 0–0.11)
IMM GRANULOCYTES NFR BLD AUTO: 0.5 % (ref 0–0.9)
KETONES UR STRIP.AUTO-MCNC: 15 MG/DL
LEUKOCYTE ESTERASE UR QL STRIP.AUTO: NEGATIVE
LIPASE SERPL-CCNC: 21 U/L (ref 7–58)
LYMPHOCYTES # BLD AUTO: 1.49 K/UL (ref 1–4.8)
LYMPHOCYTES NFR BLD: 9.7 % (ref 22–41)
MCH RBC QN AUTO: 27.2 PG (ref 27–33)
MCHC RBC AUTO-ENTMCNC: 32.6 G/DL (ref 33.6–35)
MCV RBC AUTO: 83.6 FL (ref 81.4–97.8)
MICRO URNS: ABNORMAL
MONOCYTES # BLD AUTO: 0.79 K/UL (ref 0–0.85)
MONOCYTES NFR BLD AUTO: 5.1 % (ref 0–13.4)
MUCOUS THREADS #/AREA URNS HPF: ABNORMAL /HPF
NEUTROPHILS # BLD AUTO: 12.83 K/UL (ref 2–7.15)
NEUTROPHILS NFR BLD: 83.3 % (ref 44–72)
NITRITE UR QL STRIP.AUTO: NEGATIVE
NRBC # BLD AUTO: 0 K/UL
NRBC BLD-RTO: 0 /100 WBC
PATHOLOGY CONSULT NOTE: NORMAL
PH UR STRIP.AUTO: 6.5 [PH] (ref 5–8)
PLATELET # BLD AUTO: 287 K/UL (ref 164–446)
PMV BLD AUTO: 8.5 FL (ref 9–12.9)
POTASSIUM SERPL-SCNC: 3.8 MMOL/L (ref 3.6–5.5)
PROT SERPL-MCNC: 7.3 G/DL (ref 6–8.2)
PROT UR QL STRIP: NEGATIVE MG/DL
RBC # BLD AUTO: 4.7 M/UL (ref 4.2–5.4)
RBC # URNS HPF: ABNORMAL /HPF
RBC UR QL AUTO: ABNORMAL
SODIUM SERPL-SCNC: 136 MMOL/L (ref 135–145)
SP GR UR STRIP.AUTO: 1.01
TROPONIN T SERPL-MCNC: <6 NG/L (ref 6–19)
WBC # BLD AUTO: 15.4 K/UL (ref 4.8–10.8)
WBC #/AREA URNS HPF: ABNORMAL /HPF

## 2019-12-16 PROCEDURE — 700117 HCHG RX CONTRAST REV CODE 255: Performed by: EMERGENCY MEDICINE

## 2019-12-16 PROCEDURE — 160048 HCHG OR STATISTICAL LEVEL 1-5: Performed by: SURGERY

## 2019-12-16 PROCEDURE — 501583 HCHG TROCAR, THRD CAN&SEAL 5X100: Performed by: SURGERY

## 2019-12-16 PROCEDURE — 160002 HCHG RECOVERY MINUTES (STAT): Performed by: SURGERY

## 2019-12-16 PROCEDURE — 501399 HCHG SPECIMAN BAG, ENDO CATC: Performed by: SURGERY

## 2019-12-16 PROCEDURE — 700101 HCHG RX REV CODE 250: Performed by: EMERGENCY MEDICINE

## 2019-12-16 PROCEDURE — 160009 HCHG ANES TIME/MIN: Performed by: SURGERY

## 2019-12-16 PROCEDURE — 700105 HCHG RX REV CODE 258: Performed by: EMERGENCY MEDICINE

## 2019-12-16 PROCEDURE — 501571 HCHG TROCAR, SEPARATOR 12X100: Performed by: SURGERY

## 2019-12-16 PROCEDURE — 700111 HCHG RX REV CODE 636 W/ 250 OVERRIDE (IP): Performed by: EMERGENCY MEDICINE

## 2019-12-16 PROCEDURE — 85025 COMPLETE CBC W/AUTO DIFF WBC: CPT

## 2019-12-16 PROCEDURE — 81025 URINE PREGNANCY TEST: CPT

## 2019-12-16 PROCEDURE — 99285 EMERGENCY DEPT VISIT HI MDM: CPT

## 2019-12-16 PROCEDURE — 700101 HCHG RX REV CODE 250: Performed by: SURGERY

## 2019-12-16 PROCEDURE — 770006 HCHG ROOM/CARE - MED/SURG/GYN SEMI*

## 2019-12-16 PROCEDURE — 93005 ELECTROCARDIOGRAM TRACING: CPT | Performed by: EMERGENCY MEDICINE

## 2019-12-16 PROCEDURE — 160035 HCHG PACU - 1ST 60 MINS PHASE I: Performed by: SURGERY

## 2019-12-16 PROCEDURE — 700111 HCHG RX REV CODE 636 W/ 250 OVERRIDE (IP)

## 2019-12-16 PROCEDURE — 83690 ASSAY OF LIPASE: CPT

## 2019-12-16 PROCEDURE — 700101 HCHG RX REV CODE 250: Performed by: ANESTHESIOLOGY

## 2019-12-16 PROCEDURE — A9270 NON-COVERED ITEM OR SERVICE: HCPCS | Performed by: ANESTHESIOLOGY

## 2019-12-16 PROCEDURE — 500002 HCHG ADHESIVE, DERMABOND: Performed by: SURGERY

## 2019-12-16 PROCEDURE — 500800 HCHG LAPAROSCOPIC J/L HOOK: Performed by: SURGERY

## 2019-12-16 PROCEDURE — 502571 HCHG PACK, LAP CHOLE: Performed by: SURGERY

## 2019-12-16 PROCEDURE — 84484 ASSAY OF TROPONIN QUANT: CPT

## 2019-12-16 PROCEDURE — 96374 THER/PROPH/DIAG INJ IV PUSH: CPT

## 2019-12-16 PROCEDURE — 160028 HCHG SURGERY MINUTES - 1ST 30 MINS LEVEL 3: Performed by: SURGERY

## 2019-12-16 PROCEDURE — 80053 COMPREHEN METABOLIC PANEL: CPT

## 2019-12-16 PROCEDURE — 0DTJ4ZZ RESECTION OF APPENDIX, PERCUTANEOUS ENDOSCOPIC APPROACH: ICD-10-PCS | Performed by: SURGERY

## 2019-12-16 PROCEDURE — 74177 CT ABD & PELVIS W/CONTRAST: CPT

## 2019-12-16 PROCEDURE — 501570 HCHG TROCAR, SEPARATOR: Performed by: SURGERY

## 2019-12-16 PROCEDURE — 501838 HCHG SUTURE GENERAL: Performed by: SURGERY

## 2019-12-16 PROCEDURE — 700102 HCHG RX REV CODE 250 W/ 637 OVERRIDE(OP): Performed by: ANESTHESIOLOGY

## 2019-12-16 PROCEDURE — 700111 HCHG RX REV CODE 636 W/ 250 OVERRIDE (IP): Performed by: ANESTHESIOLOGY

## 2019-12-16 PROCEDURE — 160039 HCHG SURGERY MINUTES - EA ADDL 1 MIN LEVEL 3: Performed by: SURGERY

## 2019-12-16 PROCEDURE — 88304 TISSUE EXAM BY PATHOLOGIST: CPT

## 2019-12-16 PROCEDURE — 81001 URINALYSIS AUTO W/SCOPE: CPT

## 2019-12-16 PROCEDURE — 36415 COLL VENOUS BLD VENIPUNCTURE: CPT

## 2019-12-16 RX ORDER — ONDANSETRON 2 MG/ML
4 INJECTION INTRAMUSCULAR; INTRAVENOUS
Status: DISCONTINUED | OUTPATIENT
Start: 2019-12-16 | End: 2019-12-16 | Stop reason: HOSPADM

## 2019-12-16 RX ORDER — BUPIVACAINE HYDROCHLORIDE AND EPINEPHRINE 5; 5 MG/ML; UG/ML
INJECTION, SOLUTION EPIDURAL; INTRACAUDAL; PERINEURAL
Status: DISCONTINUED | OUTPATIENT
Start: 2019-12-16 | End: 2019-12-16 | Stop reason: HOSPADM

## 2019-12-16 RX ORDER — MORPHINE SULFATE 4 MG/ML
4 INJECTION, SOLUTION INTRAMUSCULAR; INTRAVENOUS EVERY 4 HOURS PRN
Status: DISCONTINUED | OUTPATIENT
Start: 2019-12-16 | End: 2019-12-16

## 2019-12-16 RX ORDER — ROCURONIUM BROMIDE 10 MG/ML
INJECTION, SOLUTION INTRAVENOUS PRN
Status: DISCONTINUED | OUTPATIENT
Start: 2019-12-16 | End: 2019-12-16 | Stop reason: SURG

## 2019-12-16 RX ORDER — BISACODYL 10 MG
10 SUPPOSITORY, RECTAL RECTAL
Status: DISCONTINUED | OUTPATIENT
Start: 2019-12-16 | End: 2019-12-16 | Stop reason: HOSPADM

## 2019-12-16 RX ORDER — SODIUM CHLORIDE, SODIUM LACTATE, POTASSIUM CHLORIDE, CALCIUM CHLORIDE 600; 310; 30; 20 MG/100ML; MG/100ML; MG/100ML; MG/100ML
1000 INJECTION, SOLUTION INTRAVENOUS ONCE
Status: COMPLETED | OUTPATIENT
Start: 2019-12-16 | End: 2019-12-16

## 2019-12-16 RX ORDER — HALOPERIDOL 5 MG/ML
1 INJECTION INTRAMUSCULAR
Status: DISCONTINUED | OUTPATIENT
Start: 2019-12-16 | End: 2019-12-16 | Stop reason: HOSPADM

## 2019-12-16 RX ORDER — POLYETHYLENE GLYCOL 3350 17 G/17G
1 POWDER, FOR SOLUTION ORAL
Status: DISCONTINUED | OUTPATIENT
Start: 2019-12-16 | End: 2019-12-16 | Stop reason: HOSPADM

## 2019-12-16 RX ORDER — MORPHINE SULFATE 4 MG/ML
2 INJECTION, SOLUTION INTRAMUSCULAR; INTRAVENOUS EVERY 4 HOURS PRN
Status: DISCONTINUED | OUTPATIENT
Start: 2019-12-16 | End: 2019-12-16 | Stop reason: HOSPADM

## 2019-12-16 RX ORDER — ONDANSETRON 2 MG/ML
4 INJECTION INTRAMUSCULAR; INTRAVENOUS EVERY 6 HOURS PRN
Status: DISCONTINUED | OUTPATIENT
Start: 2019-12-16 | End: 2019-12-16 | Stop reason: HOSPADM

## 2019-12-16 RX ORDER — DIPHENHYDRAMINE HYDROCHLORIDE 50 MG/ML
12.5 INJECTION INTRAMUSCULAR; INTRAVENOUS
Status: DISCONTINUED | OUTPATIENT
Start: 2019-12-16 | End: 2019-12-16 | Stop reason: HOSPADM

## 2019-12-16 RX ORDER — SODIUM CHLORIDE, SODIUM LACTATE, POTASSIUM CHLORIDE, CALCIUM CHLORIDE 600; 310; 30; 20 MG/100ML; MG/100ML; MG/100ML; MG/100ML
1000 INJECTION, SOLUTION INTRAVENOUS CONTINUOUS
Status: DISCONTINUED | OUTPATIENT
Start: 2019-12-16 | End: 2019-12-16 | Stop reason: HOSPADM

## 2019-12-16 RX ORDER — DEXAMETHASONE SODIUM PHOSPHATE 4 MG/ML
INJECTION, SOLUTION INTRA-ARTICULAR; INTRALESIONAL; INTRAMUSCULAR; INTRAVENOUS; SOFT TISSUE PRN
Status: DISCONTINUED | OUTPATIENT
Start: 2019-12-16 | End: 2019-12-16 | Stop reason: SURG

## 2019-12-16 RX ORDER — AMOXICILLIN 250 MG
2 CAPSULE ORAL 2 TIMES DAILY
Status: DISCONTINUED | OUTPATIENT
Start: 2019-12-16 | End: 2019-12-16 | Stop reason: HOSPADM

## 2019-12-16 RX ORDER — MIDAZOLAM HYDROCHLORIDE 1 MG/ML
INJECTION INTRAMUSCULAR; INTRAVENOUS PRN
Status: DISCONTINUED | OUTPATIENT
Start: 2019-12-16 | End: 2019-12-16 | Stop reason: SURG

## 2019-12-16 RX ORDER — SODIUM CHLORIDE, SODIUM LACTATE, POTASSIUM CHLORIDE, CALCIUM CHLORIDE 600; 310; 30; 20 MG/100ML; MG/100ML; MG/100ML; MG/100ML
INJECTION, SOLUTION INTRAVENOUS CONTINUOUS
Status: DISCONTINUED | OUTPATIENT
Start: 2019-12-16 | End: 2019-12-16 | Stop reason: HOSPADM

## 2019-12-16 RX ORDER — OXYCODONE HCL 5 MG/5 ML
10 SOLUTION, ORAL ORAL
Status: COMPLETED | OUTPATIENT
Start: 2019-12-16 | End: 2019-12-16

## 2019-12-16 RX ORDER — OXYCODONE HCL 5 MG/5 ML
5 SOLUTION, ORAL ORAL
Status: COMPLETED | OUTPATIENT
Start: 2019-12-16 | End: 2019-12-16

## 2019-12-16 RX ORDER — OXYCODONE HYDROCHLORIDE AND ACETAMINOPHEN 5; 325 MG/1; MG/1
1-2 TABLET ORAL EVERY 4 HOURS PRN
Qty: 15 TAB | Refills: 0 | Status: SHIPPED | OUTPATIENT
Start: 2019-12-16 | End: 2019-12-23

## 2019-12-16 RX ADMIN — CEFTRIAXONE SODIUM 1 G: 1 INJECTION, POWDER, FOR SOLUTION INTRAMUSCULAR; INTRAVENOUS at 05:24

## 2019-12-16 RX ADMIN — ROCURONIUM BROMIDE 30 MG: 10 INJECTION, SOLUTION INTRAVENOUS at 07:23

## 2019-12-16 RX ADMIN — FENTANYL CITRATE 100 MCG: 50 INJECTION, SOLUTION INTRAMUSCULAR; INTRAVENOUS at 07:30

## 2019-12-16 RX ADMIN — OXYCODONE HYDROCHLORIDE 5 MG: 5 SOLUTION ORAL at 08:12

## 2019-12-16 RX ADMIN — SUGAMMADEX 200 MG: 100 INJECTION, SOLUTION INTRAVENOUS at 07:44

## 2019-12-16 RX ADMIN — SODIUM CHLORIDE, POTASSIUM CHLORIDE, SODIUM LACTATE AND CALCIUM CHLORIDE 1000 ML: 600; 310; 30; 20 INJECTION, SOLUTION INTRAVENOUS at 05:24

## 2019-12-16 RX ADMIN — FENTANYL CITRATE 150 MCG: 50 INJECTION, SOLUTION INTRAMUSCULAR; INTRAVENOUS at 07:23

## 2019-12-16 RX ADMIN — METRONIDAZOLE 500 MG: 500 INJECTION, SOLUTION INTRAVENOUS at 06:11

## 2019-12-16 RX ADMIN — MIDAZOLAM HYDROCHLORIDE 2 MG: 1 INJECTION, SOLUTION INTRAMUSCULAR; INTRAVENOUS at 07:23

## 2019-12-16 RX ADMIN — SODIUM CHLORIDE, POTASSIUM CHLORIDE, SODIUM LACTATE AND CALCIUM CHLORIDE 1000 ML: 600; 310; 30; 20 INJECTION, SOLUTION INTRAVENOUS at 10:22

## 2019-12-16 RX ADMIN — MORPHINE SULFATE 2 MG: 4 INJECTION INTRAVENOUS at 03:49

## 2019-12-16 RX ADMIN — DEXAMETHASONE SODIUM PHOSPHATE 8 MG: 4 INJECTION, SOLUTION INTRAMUSCULAR; INTRAVENOUS at 07:23

## 2019-12-16 RX ADMIN — ONDANSETRON 4 MG: 2 INJECTION INTRAMUSCULAR; INTRAVENOUS at 07:23

## 2019-12-16 RX ADMIN — IOHEXOL 100 ML: 350 INJECTION, SOLUTION INTRAVENOUS at 02:47

## 2019-12-16 RX ADMIN — PROPOFOL 200 MG: 10 INJECTION, EMULSION INTRAVENOUS at 07:23

## 2019-12-16 SDOH — HEALTH STABILITY: MENTAL HEALTH: HOW OFTEN DO YOU HAVE A DRINK CONTAINING ALCOHOL?: MONTHLY OR LESS

## 2019-12-16 ASSESSMENT — COGNITIVE AND FUNCTIONAL STATUS - GENERAL
DRESSING REGULAR LOWER BODY CLOTHING: A LITTLE
HELP NEEDED FOR BATHING: A LITTLE
DAILY ACTIVITIY SCORE: 18
TOILETING: A LITTLE
MOVING TO AND FROM BED TO CHAIR: A LITTLE
PERSONAL GROOMING: A LITTLE
DRESSING REGULAR UPPER BODY CLOTHING: A LITTLE
SUGGESTED CMS G CODE MODIFIER DAILY ACTIVITY: CK
SUGGESTED CMS G CODE MODIFIER MOBILITY: CK
WALKING IN HOSPITAL ROOM: A LITTLE
EATING MEALS: A LITTLE
MOVING FROM LYING ON BACK TO SITTING ON SIDE OF FLAT BED: A LITTLE
STANDING UP FROM CHAIR USING ARMS: A LITTLE
CLIMB 3 TO 5 STEPS WITH RAILING: A LITTLE
TURNING FROM BACK TO SIDE WHILE IN FLAT BAD: A LITTLE
MOBILITY SCORE: 18

## 2019-12-16 ASSESSMENT — LIFESTYLE VARIABLES
AVERAGE NUMBER OF DAYS PER WEEK YOU HAVE A DRINK CONTAINING ALCOHOL: 0
CONSUMPTION TOTAL: NEGATIVE
EVER FELT BAD OR GUILTY ABOUT YOUR DRINKING: NO
ON A TYPICAL DAY WHEN YOU DRINK ALCOHOL HOW MANY DRINKS DO YOU HAVE: 0
EVER HAD A DRINK FIRST THING IN THE MORNING TO STEADY YOUR NERVES TO GET RID OF A HANGOVER: NO
HAVE YOU EVER FELT YOU SHOULD CUT DOWN ON YOUR DRINKING: NO
DOES PATIENT WANT TO STOP DRINKING: NO
TOTAL SCORE: 0
TOTAL SCORE: 0
EVER_SMOKED: NEVER
TOTAL SCORE: 0
HAVE PEOPLE ANNOYED YOU BY CRITICIZING YOUR DRINKING: NO
HOW MANY TIMES IN THE PAST YEAR HAVE YOU HAD 5 OR MORE DRINKS IN A DAY: 0
ALCOHOL_USE: NO

## 2019-12-16 ASSESSMENT — PATIENT HEALTH QUESTIONNAIRE - PHQ9
SUM OF ALL RESPONSES TO PHQ9 QUESTIONS 1 AND 2: 0
2. FEELING DOWN, DEPRESSED, IRRITABLE, OR HOPELESS: NOT AT ALL
1. LITTLE INTEREST OR PLEASURE IN DOING THINGS: NOT AT ALL

## 2019-12-16 ASSESSMENT — PAIN SCALES - GENERAL: PAIN_LEVEL: 2

## 2019-12-16 NOTE — ANESTHESIA PREPROCEDURE EVALUATION
Relevant Problems   No relevant active problems       Physical Exam    Airway   Mallampati: II  TM distance: >3 FB  Neck ROM: full       Cardiovascular   Rhythm: regular  Rate: normal     Dental - normal exam         Pulmonary    Abdominal - normal exam     Neurological - normal exam                 Anesthesia Plan    ASA 1       Plan - general       Airway plan will be ETT      Plan Factors:   Patient was not previously instructed to abstain from smoking on day of procedure.  Patient did not smoke on day of procedure.      Induction: intravenous          Informed Consent:    Anesthetic plan and risks discussed with patient.    Use of blood products discussed with: patient whom.

## 2019-12-16 NOTE — ED TRIAGE NOTES
"Chief Complaint   Patient presents with   • Abdominal Pain     started today at 12, c/o nausea and mid/right/lower abd; denies urinary symptoms and diarrhea      /101   Pulse 100   Temp 36.3 °C (97.3 °F) (Temporal)   Resp 18   Ht 1.651 m (5' 5\")   Wt 69.7 kg (153 lb 10.6 oz)   LMP 11/21/2019   SpO2 96%   BMI 25.57 kg/m²     Pt arrived w/ above concern. Pt had similar abdominal pain about a few months ago and was seen by GI doctor - worked up for gallbladder, H-Pylori, and gluten intolerance - all negative per pt.   "

## 2019-12-16 NOTE — ANESTHESIA QCDR
2019 [facility] Clinical Data Registry (for Quality Improvement)     Postoperative nausea/vomiting risk protocol (Adult = 18 yrs and Pediatric 3-17 yrs)- (430 and 463)  General inhalation anesthetic (NOT TIVA) with PONV risk factors: Yes  Provision of anti-emetic therapy with at least 2 different classes of agents: Yes   Patient DID NOT receive anti-emetic therapy and reason is documented in Medical Record:  N/A    Multimodal Pain Management- (AQI59)  Patient undergoing Elective Surgery (i.e. Outpatient, or ASC, or Prescheduled Surgery prior to Hospital Admission): No  Use of Multimodal Pain Management, two or more drugs and/or interventions, NOT including systemic opioids: N/A  Exception: Documented allergy to multiple classes of analgesics: N/A    PACU assessment of acute postoperative pain prior to Anesthesia Care End- Applies to Patients Age = 18- (ABG7)  Initial PACU pain score is which of the following: < 7/10  Patient unable to report pain score: N/A    Post-anesthetic transfer of care checklist/protocol to PACU/ICU- (426 and 427)  Upon conclusion of case, patient transferred to which of the following locations: PACU/Non-ICU  Use of transfer checklist/protocol: Yes  Exclusion: Service Performed in Patient Hospital Room (and thus did not require transfer): N/A    PACU Reintubation- (AQI31)  General anesthesia requiring endotracheal intubation (ETT) along with subsequent extubation in OR or PACU: Yes  Required reintubation in the PACU: No   Extubation was a planned trial documented in the medical record prior to removal of the original airway device:  N/A    Unplanned admission to ICU related to anesthesia service up through end of PACU care- (MD51)  Unplanned admission to ICU (not initially anticipated at anesthesia start time): No

## 2019-12-16 NOTE — PROGRESS NOTES
Pt met criteria for d/c home. Pt voided and ambulated 50 ft. Pt was sating above 90% RA and using IS as needed. Pt tolerated PO fluids. Pt had some nausea and requested Rx zofran for home. This RN was unable to get in contact with Dr. Perez and pt agreed to f/u with MD tomorrow morning. CN made aware.     Patient discharged to home per order.  Reviewed all discharge instructions, appointments, discharge medications, and wound care instructions with patient and they verbalize understanding.  Education provided in discharge instructions and Home Safety and Fall Prevention. Discharge paperwork completed; signed copies in chart.  Patient has education binder and all belongings; signed copy in chart.  Pt alert, calm, stable; no change in status from morning assessment.  Patient left facility at 1252 via wheel chair accompanied and escorted to car by staff.

## 2019-12-16 NOTE — PROGRESS NOTES
Pt states she feels a little nauseous, noted zofran is too early to administer. Offered pt saltines and sprite at this time. Noted pt has oxycodone rx for home but not zofran. Called and left message for Dr. Perez's office and awaiting call back.

## 2019-12-16 NOTE — PROGRESS NOTES
Pt arrived via gurney, admitted to room 215-2 from ER  Pt is A&Ox4, c/o mid- abdominal pain; Morphine 2mg given at 0349 in ER and it is still not time to medicate; hot packs given   Pt aware of NPO status for surgery in AM  CHG- bath completed   IVF - LR running at 125mL/hr; 1g Rocephin running at 200mL/hr  Oriented to room call light and smoking policy.   Reviewed plan of care with the patient and the family.   Fall precaution in place.    Call light within reach.   Encouraged pt the importance to call for assistance.   Continue to monitor.

## 2019-12-16 NOTE — PROGRESS NOTES
Received report from PACU RN. Pt arrived to unit via gurney at 0832. Pt reports tolerable pain at this time. Noted 3 lap sites to abdomen with dermabond, no drainage noted. Once pt meets criteria pt is hoping to go home today. Will continue to monitor.

## 2019-12-16 NOTE — DISCHARGE INSTRUCTIONS
Laparoscopic Appendectomy, Adult, Care After  These instructions give you information on caring for yourself after your procedure. Your doctor may also give you more specific instructions. Call your doctor if you have any problems or questions after your procedure.  HOME CARE  · Do not drive while taking pain medicine (narcotics).  · Take medicine (stool softener) if you cannot poop (constipated).  · Change your bandages (dressings) as told by your doctor.  · Keep your wounds clean and dry. Wash the wounds gently with soap and water. Gently pat the wounds dry with a clean towel.  · Do not take baths, swim, or use hot tubs for 10 days, or as told by your doctor.  · Only take medicine as told by your doctor.  · Continue your normal diet as told by your doctor.  · Do not lift more than 10 pounds (4.5 kilograms) or play contact sports for 3 weeks, or as told by your doctor.  · Slowly increase your activity.  · Take deep breaths to avoid a lung infection (pneumonia).  GET HELP RIGHT AWAY IF:   · You have a fever.  · You have a rash.  · You have trouble breathing or sharp chest pain.  · You have a reaction to the medicine you are taking.  · Your wound is red, puffy (swollen), or painful.  · You have yellowish-white fluid (pus) coming from the wound.  · You have fluid coming from the wound for longer than 1 day.  · You notice a bad smell coming from the wound or bandage.  · Your wound breaks open after stitches (sutures) or staples are removed.  · You have pain in the shoulders or shoulder blades.  · You feel dizzy or pass out (faint).  · You are short of breath.  · You feel sick to your stomach (nauseous) or throw up (vomit).  · You cannot control when you poop, or you lose your appetite.  · You have watery poop (diarrhea).  MAKE SURE YOU:  · Understand these instructions.  · Will watch your condition.  · Will get help right away if you are not doing well or get worse.     This information is not intended to replace  advice given to you by your health care provider. Make sure you discuss any questions you have with your health care provider.     Document Released: 10/14/2010 Document Revised: 01/08/2016 Document Reviewed: 06/26/2012  [employer] Interactive Patient Education ©2016 [employer]    Discharge Instructions    Discharged to home by car with relative. Discharged via wheelchair, hospital escort: Yes.  Special equipment needed: Not Applicable    Be sure to schedule a follow-up appointment with your primary care doctor or any specialists as instructed.     Discharge Plan:   Diet Plan: Discussed  Activity Level: Discussed  Confirmed Follow up Appointment: Patient to Call and Schedule Appointment  Confirmed Symptoms Management: Discussed  Medication Reconciliation Updated: Yes  Influenza Vaccine Indication: Not indicated: Previously immunized this influenza season and > 8 years of age    I understand that a diet low in cholesterol, fat, and sodium is recommended for good health. Unless I have been given specific instructions below for another diet, I accept this instruction as my diet prescription.   Other diet: advance as tolerated    Special Instructions: None    · Is patient discharged on Warfarin / Coumadin?   No     Depression / Suicide Risk    As you are discharged from this [facility] facility, it is important to learn how to keep safe from harming yourself.    Recognize the warning signs:  · Abrupt changes in personality, positive or negative- including increase in energy   · Giving away possessions  · Change in eating patterns- significant weight changes-  positive or negative  · Change in sleeping patterns- unable to sleep or sleeping all the time   · Unwillingness or inability to communicate  · Depression  · Unusual sadness, discouragement and loneliness  · Talk of wanting to die  · Neglect of personal appearance   · Rebelliousness- reckless behavior  · Withdrawal from people/activities they love  · Confusion-  inability to concentrate     If you or a loved one observes any of these behaviors or has concerns about self-harm, here's what you can do:  · Talk about it- your feelings and reasons for harming yourself  · Remove any means that you might use to hurt yourself (examples: pills, rope, extension cords, firearm)  · Get professional help from the community (Mental Health, Substance Abuse, psychological counseling)  · Do not be alone:Call your Safe Contact- someone whom you trust who will be there for you.  · Call your local CRISIS HOTLINE 171-0994 or 443-486-4883  · Call your local Children's Mobile Crisis Response Team [place] (243) 771-7780 or www.[employer]  · Call the toll free National Suicide Prevention Hotlines   · National Suicide Prevention Lifeline 869-391-FCST (1652)  · National Hope Line Network 800-SUICIDE (222-2361)

## 2019-12-16 NOTE — ANESTHESIA PROCEDURE NOTES
Airway  Date/Time: 12/16/2019 7:26 AM  Performed by: Matthias Liu M.D.  Authorized by: Matthias Liu M.D.     Location:  OR  Urgency:  Elective  Difficult Airway: No    Indications for Airway Management:  Anesthesia  Spontaneous Ventilation: present    Sedation Level:  Deep  Preoxygenated: Yes    Patient Position:  Sniffing  MILS Maintained Throughout: Yes    Mask Difficulty Assessment:  0 - not attempted  Final Airway Type:  Endotracheal airway  Final Endotracheal Airway:  ETT  Cuffed: Yes    Technique Used for Successful ETT Placement:  Direct laryngoscopy  Devices/Methods Used in Placement:  Cricoid pressure and intubating stylet  Blade Type:  Mary Jane  Laryngoscope Blade/Videolaryngoscope Blade Size:  3  ETT Size (mm):  7.0  Placement Verified by: auscultation and capnometry    Cormack-Lehane Classification:  Grade IIa - partial view of glottis  Number of Attempts at Approach:  1

## 2019-12-16 NOTE — ANESTHESIA TIME REPORT
Anesthesia Start and Stop Event Times     Date Time Event    12/16/2019 0712 Ready for Procedure     0714 Anesthesia Start     0749 Anesthesia Stop        Responsible Staff  12/16/19    Name Role Begin End    Mathtias Liu M.D. Anesth 0714 0749        Preop Diagnosis (Free Text):  Pre-op Diagnosis     appendicitis        Preop Diagnosis (Codes):    Post op Diagnosis  Appendicitis      Premium Reason  Non-Premium    Comments:

## 2019-12-16 NOTE — ANESTHESIA POSTPROCEDURE EVALUATION
Patient: Malia Delgado    Procedure Summary     Date:  12/16/19 Room / Location:  [place] [address] / SURGERY [facility]    Anesthesia Start:  0714 Anesthesia Stop:  0749    Procedure:  APPENDECTOMY, LAPAROSCOPIC Diagnosis:  (appendicitis)    Surgeon:  Lewis Perez M.D. Responsible Provider:  Matthias Liu M.D.    Anesthesia Type:  general ASA Status:  1          Final Anesthesia Type: general  Last vitals  BP   Blood Pressure: 133/97    Temp   36.7 °C (98.1 °F)    Pulse   Pulse: 78   Resp   18    SpO2   97 %      Anesthesia Post Evaluation    Patient location during evaluation: PACU  Patient participation: complete - patient participated  Level of consciousness: awake  Pain score: 2    Airway patency: patent  Anesthetic complications: no  Cardiovascular status: adequate  Respiratory status: acceptable  Hydration status: acceptable    PONV: none           Nurse Pain Score: 4 (NPRS)

## 2019-12-16 NOTE — OR NURSING
0748 pt to pacu s/p general anes for lap appy. VSS. Lungs clear O2 in place to patent ETT.  Lap stabs x 3 to abdomen are covered with dermabond. No oozing or hematoma observed. 0755 ETT expelled without problems. HOB up 45 degrees.  Pt states no pain to abdomen.  Falls easily back to sleep. Allow to rest prn. 0805 pt weepy. Consoled per writer. 0811 pt states little pain. See MAR. Tolerated PO before given orals.  0818 pt meets PACU DC criteria for transfer back to [facility].

## 2019-12-16 NOTE — ED PROVIDER NOTES
ED Provider Note    CHIEF COMPLAINT  Chief Complaint   Patient presents with   • Abdominal Pain     started today at 12, c/o nausea and mid/right/lower abd; denies urinary symptoms and diarrhea         HPI    Primary care provider: Pcp Pt States None   History obtained from: Patient  History limited by: None     Malia Delgado is a 34 y.o. female who presents to the ED with  complaining of epigastric abdominal pain starting around noon today that radiates to the right upper quadrant and periumbilical area.  She describes the pain as sharp and has been persistent since it began.  She also had some pain radiating to her back but no back pain at this time.  She otherwise denies pain anywhere else.  She denies any fever.  She has had some nausea without vomiting.  She also reports increased bowel movements today but no diarrhea or blood noted.  She denies any dysuria.  She denies possibility of pregnancy and states that she started her period today.  No prior abdominal surgeries.  She took some Tums without improvement.  She has not noticed anything that has made her pain better or worse.  Patient states similar pain about 5 months ago with negative ultrasound.  She is requesting to have CT today.  She declines any nausea or pain medicine at this time.    REVIEW OF SYSTEMS  Please see HPI for pertinent positives/negatives.  All other systems reviewed and are negative.     PAST MEDICAL HISTORY  No past medical history on file.     SURGICAL HISTORY  History reviewed. No pertinent surgical history.     SOCIAL HISTORY  Social History     Tobacco Use   • Smoking status: Never Smoker   • Smokeless tobacco: Never Used   Substance and Sexual Activity   • Alcohol use: Yes     Alcohol/week: 0.0 oz     Frequency: Monthly or less     Comment: social   • Drug use: Never   • Sexual activity: Not on file        FAMILY HISTORY  History reviewed. No pertinent family history.     CURRENT MEDICATIONS  Home Medications   "  **Home medications have not yet been reviewed for this encounter**          ALLERGIES  No Known Allergies     PHYSICAL EXAM  VITAL SIGNS: /101   Pulse 100   Temp 36.3 °C (97.3 °F) (Temporal)   Resp 18   Ht 1.651 m (5' 5\")   Wt 69.7 kg (153 lb 10.6 oz)   LMP 11/21/2019   SpO2 96%   BMI 25.57 kg/m²  @SHIMA[[id number]::@     Pulse ox interpretation: 96% I interpret this pulse ox as normal     Constitutional: Well developed, well nourished, alert in no apparent distress, nontoxic appearance    HENT: No external signs of trauma, normocephalic, oropharynx moist and clear, nose normal    Eyes: PERRL, conjunctiva without erythema, no discharge, no icterus    Neck: Soft and supple, trachea midline, no stridor, no tenderness, no LAD, no JVD, good ROM    Cardiovascular: Regular rate and rhythm, no murmurs/rubs/gallops, strong distal pulses and good perfusion    Thorax & Lungs: No respiratory distress, CTAB   Abdomen: Soft, mild epigastric/right upper quadrant/periumbilical tenderness to palpation, nondistended, no guarding, no rebound, normal BS    Back: No CVAT    Extremities: No cyanosis, no edema, no gross deformity, good ROM, no tenderness, intact distal pulses with brisk cap refill    Skin: Warm, dry, no pallor/cyanosis, no rash noted    Lymphatic: No lymphadenopathy noted    Neuro: A/O times 3, no focal deficits noted, ambulating without difficulty  Psychiatric: Cooperative, normal mood and affect, normal judgement, appropriate for clinical situation        DIAGNOSTIC STUDIES / PROCEDURES    EKG  12 Lead EKG obtained at 0203 and interpreted by me:   Rate: 80   Rhythm: Sinus rhythm   Ectopy: None  Intervals: Normal   Axis: Normal   QRS: Normal   ST segments: Normal  T Waves: Normal    Clinical Impression: Sinus rhythm without acute ischemic changes or dysrhythmia       LABS  All labs reviewed by me.     Results for orders placed or performed during the hospital encounter of 12/16/19   CBC WITH DIFFERENTIAL "   Result Value Ref Range    WBC 15.4 (H) 4.8 - 10.8 K/uL    RBC 4.70 4.20 - 5.40 M/uL    Hemoglobin 12.8 12.0 - 16.0 g/dL    Hematocrit 39.3 37.0 - 47.0 %    MCV 83.6 81.4 - 97.8 fL    MCH 27.2 27.0 - 33.0 pg    MCHC 32.6 (L) 33.6 - 35.0 g/dL    RDW 36.7 35.9 - 50.0 fL    Platelet Count 287 164 - 446 K/uL    MPV 8.5 (L) 9.0 - 12.9 fL    Neutrophils-Polys 83.30 (H) 44.00 - 72.00 %    Lymphocytes 9.70 (L) 22.00 - 41.00 %    Monocytes 5.10 0.00 - 13.40 %    Eosinophils 0.70 0.00 - 6.90 %    Basophils 0.70 0.00 - 1.80 %    Immature Granulocytes 0.50 0.00 - 0.90 %    Nucleated RBC 0.00 /100 WBC    Neutrophils (Absolute) 12.83 (H) 2.00 - 7.15 K/uL    Lymphs (Absolute) 1.49 1.00 - 4.80 K/uL    Monos (Absolute) 0.79 0.00 - 0.85 K/uL    Eos (Absolute) 0.10 0.00 - 0.51 K/uL    Baso (Absolute) 0.10 0.00 - 0.12 K/uL    Immature Granulocytes (abs) 0.07 0.00 - 0.11 K/uL    NRBC (Absolute) 0.00 K/uL   COMP METABOLIC PANEL   Result Value Ref Range    Sodium 136 135 - 145 mmol/L    Potassium 3.8 3.6 - 5.5 mmol/L    Chloride 99 96 - 112 mmol/L    Co2 21 20 - 33 mmol/L    Anion Gap 16.0 (H) 0.0 - 11.9    Glucose 104 (H) 65 - 99 mg/dL    Bun 9 8 - 22 mg/dL    Creatinine 0.75 0.50 - 1.40 mg/dL    Calcium 10.1 8.4 - 10.2 mg/dL    AST(SGOT) 18 12 - 45 U/L    ALT(SGPT) 13 2 - 50 U/L    Alkaline Phosphatase 62 30 - 99 U/L    Total Bilirubin 0.7 0.1 - 1.5 mg/dL    Albumin 4.6 3.2 - 4.9 g/dL    Total Protein 7.3 6.0 - 8.2 g/dL    Globulin 2.7 1.9 - 3.5 g/dL    A-G Ratio 1.7 g/dL   LIPASE   Result Value Ref Range    Lipase 21 7 - 58 U/L   URINALYSIS CULTURE, IF INDICATED   Result Value Ref Range    Color Yellow     Character Clear     Specific Gravity 1.015 <1.035    Ph 6.5 5.0 - 8.0    Glucose Negative Negative mg/dL    Ketones 15 (A) Negative mg/dL    Protein Negative Negative mg/dL    Bilirubin Negative Negative    Nitrite Negative Negative    Leukocyte Esterase Negative Negative    Occult Blood Small (A) Negative    Micro Urine Req  Microscopic    BETA-HCG QUALITATIVE URINE   Result Value Ref Range    Beta-Hcg Urine Negative Negative   TROPONIN   Result Value Ref Range    Troponin T <6 6 - 19 ng/L   URINE MICROSCOPIC (W/UA)   Result Value Ref Range    WBC 0-2 /hpf    RBC 0-2 /hpf    Bacteria Few (A) None /hpf    Epithelial Cells Few Few /hpf    Mucous Threads Rare /hpf   ESTIMATED GFR   Result Value Ref Range    GFR If African American >60 >60 mL/min/1.73 m 2    GFR If Non African American >60 >60 mL/min/1.73 m 2   EKG (NOW)   Result Value Ref Range    Report       [facility]    Test Date:  2019  Pt Name:    MEL CHERRY                   Department: [facility]  MRN:        [id number]                      Room:       -ROOM 2  Gender:     Female                       Technician: RAHEEL  :        [id number]                   Requested By:DIANE ANTOINE  Order #:    [id number]                    Reading MD:    Measurements  Intervals                                Axis  Rate:       80                           P:          22  NC:         124                          QRS:        24  QRSD:       87                           T:          27  QT:         376  QTc:        434    Interpretive Statements  Sinus rhythm  No previous ECG available for comparison          RADIOLOGY  The radiologist's interpretation of all radiological studies have been reviewed by me.     CT-ABDOMEN-PELVIS WITH   Final Result         1.  Changes compatible with acute appendicitis   2.  Enhancing left ovarian cyst, likely involuting cyst   3.  Diverticulosis   4.  Hepatomegaly      These findings were discussed with the patient's clinician, Diane Antoine, on 2019 2:55 AM.             COURSE & MEDICAL DECISION MAKING  Nursing notes, VS, PMSFHx reviewed in chart.     Review of past medical records shows the patient without prior visits to this ED.  She had complete abdominal ultrasound performed on May 23, 2019 without any significant  abnormality.      Differential diagnoses considered include but are not limited to: Appendicitis, AMI, AAA, ileus, cholecystitis/ascending cholangitis, gallstone/biliary colic, pancreatitis, PUD, gastritis, colitis, muscle strain, hernia, pregnancy/ectopic      0310: D/W Dr. Perez, general surgeon.  He will admit patient.  He would like patient started on Cefotetan.      History and physical exam as above.  EKG without evidence for acute ischemic changes or dysrhythmia.  Labs were significant for leukocytosis.  CT abdomen/pelvis with findings as above.  Findings discussed with Dr. ePrez who will admit patient.  Pharmacist called and recommended Rocephin and Flagyl instead of Cefotetan.  IV fluid was initiated in order to keep patient NPO in anticipation for surgery later today.  I discussed the findings with the patient and her  and they are agreeable to plan for admission and surgery.      HYDRATION: Based on the patient's presentation of Inability to take oral fluids the patient was given IV fluids. IV Hydration was used because oral hydration was not adequate alone. Upon recheck following hydration, the patient was unchanged.        FINAL IMPRESSION  1. Acute appendicitis, unspecified acute appendicitis type Acute          DISPOSITION  Patient will be admitted by Dr. Perez      Electronically signed by: Modesto Crowley, 12/16/2019 1:06 AM      Portions of this record were made with voice recognition software.  Despite my review, spelling/grammar/context errors may still remain.  Interpretation of this chart should be taken in this context.

## 2019-12-16 NOTE — H&P
12/16  CC: Abdominal Pain    HPI: 34y F here with diffuse abdominal pain that started at 12:00 yesterday and gradually got worse overnight.  Pain is ongoing and has started to intensify and localize to the RLQ.  She has some nausea along with the pain.  She also recalls a previous epidose of abdominal pain about a month ago and was worked up for gallstones without significant findings.     PMHx: none    PSHx: none    Meds: none    NKDA    FamHx: no colon/rectal cancers, no other pertinent family history    SocHx: No Tob/Drugs, occasional EtOH      ROS: negative except as above    Consitutional- above  HEENT- no visual changes, no sneezing or runny nose  Skin- no rashes or itching  Cardiovascular- no chest pain or palpatations  Respiratory- no SOB or cough  GI- above  - no dysurea  Neuro- no weakness or syncope  Musculoskeletal- no muscle or joint pain  Heme- no bleeding or bruising  Lymphatic- no enlarged nodes or previous splenectomy  Endocrine- No sweating or heat/cold intolerance  Allergy- No asthma or hives  Psychiatric- no depression or anxiety        Physical Exam:   AFVSS  A@O x3, NAD  NCAT, no scleral icterus  Neck nontender, no lymphadenopathy  Normal respiratory effort, no chest wall masses  RRR, 2+ pulses  Abdomen soft, no peritonitis, no masses, tender in RLQ at McBurney's point  Extremities warm and well perfused  No skin rashes or lesions    Labs: WBC 15.4, Hct 39, PLt 287  Lytes wnl    Radiology: CT A/P: 1.  Changes compatible with acute appendicitis  2.  Enhancing left ovarian cyst, likely involuting cyst  3.  Diverticulosis  4.  Hepatomegaly    A/P: 34y F here with acute appendicitis.  Risks/benefits/alternatives of surgery explained to her.  Will proceed to OR this am for lap appy.  Pt is NPO and gives consent  '

## 2019-12-16 NOTE — OR SURGEON
Immediate Post OP Note    PreOp Diagnosis: Appendicitis    PostOp Diagnosis: same    Procedure(s):  APPENDECTOMY, LAPAROSCOPIC    Surgeon(s):  Lewis Perez M.D.    Anesthesiologist/Type of Anesthesia:  Anesthesiologist: Matthias Liu M.D./* No anesthesia type entered *    Surgical Staff:  Assistant: YESY Castillo  Circulator: Mina Aquino R.N.  Scrub Person: Ho Tsai    Specimens removed if any:  ID Type Source Tests Collected by Time Destination   A :  Tissue Appendix PATHOLOGY SPECIMEN Lewis Perez M.D. 12/16/2019  7:38 AM        Estimated Blood Loss: 5cc    Findings: Inflamed appendix removed with hemostasis    Complications: none        12/16/2019 7:49 AM Lewis Perez M.D.

## 2019-12-17 NOTE — OP REPORT
DATE OF SERVICE:  12/16/2019    PREOPERATIVE DIAGNOSIS:  Appendicitis.    POSTOPERATIVE DIAGNOSIS:  Appendicitis.    PROCEDURE:  Laparoscopic appendectomy.    SURGEON:  Lewis Perez MD    ASSISTANT:  NAHUN Garland    ANESTHESIA:  General endotracheal anesthesia.    ANESTHESIOLOGIST:  Matthias Liu MD    ESTIMATED BLOOD LOSS:  5 mL    SPECIMENS:  Appendix.    COMPLICATIONS:  None.    CONDITION:  Stable.    INDICATIONS FOR PROCEDURE:  This is a 34-year-old female who presents through   the emergency department with acute onset abdominal pain.  She was diagnosed   with appendicitis and taken to the OR for further treatment.  Risks, benefits,   and alternatives of laparoscopic appendectomy were explained to her before   proceeding.    OPERATIVE FINDINGS:  Inflamed appendix in a retrocecal location, removed with   hemostasis.    OPERATIVE TECHNIQUE:  After informed consent was obtained, the patient was   taken to the operating room, placed in supine position.  After adequate   endotracheal anesthesia was achieved, the abdomen was prepped and draped in   sterile fashion.  Operation was begun by placing a 5 mm periumbilical incision   through which 5 mm trocar was introduced into the abdomen using Optiview   technique.  Pneumoperitoneum was achieved, additional trocars were placed, 12   mm in the left lower quadrant and 5 mm in the suprapubic midline.  All trocars   were placed with 0.5% Marcaine with epinephrine for local anesthesia.  The   appendix was identified in the retrocecal location.  We carefully dissected   around the appendiceal base until window was made in the mesentery.  We then   divided the appendix with a blue load 35 mm stapler.  We then used the EnSeal   device to finish removing the appendix from its mesenteric attachments with   hemostasis.  The appendix was placed in an EndoCatch bag and removed from the   12 mm trocar site.    We closed the extraction site with an 0 PDS using an  Endoclose device.    Pneumoperitoneum was reduced and all trocars were removed.  Skin incisions   were all closed with 4-0 Monocryl.  Dermabond was placed and the patient   returned to the PACU in stable condition.  All instruments counts were correct   at the end of the procedure.       ____________________________________     MD BILL Hakns / MILES    DD:  12/16/2019 17:55:59  DT:  12/16/2019 19:26:43    D#:  [id number]  Job#:  [id number]

## 2020-01-02 ENCOUNTER — HOSPITAL ENCOUNTER (OUTPATIENT)
Facility: MEDICAL CENTER | Age: 35
End: 2020-01-02
Attending: OBSTETRICS & GYNECOLOGY
Payer: COMMERCIAL

## 2020-01-02 ENCOUNTER — HOSPITAL ENCOUNTER (OUTPATIENT)
Dept: RADIOLOGY | Facility: MEDICAL CENTER | Age: 35
End: 2020-01-02
Attending: OBSTETRICS & GYNECOLOGY
Payer: COMMERCIAL

## 2020-01-02 ENCOUNTER — GYNECOLOGY VISIT (OUTPATIENT)
Dept: OBGYN | Facility: CLINIC | Age: 35
End: 2020-01-02
Payer: COMMERCIAL

## 2020-01-02 VITALS — WEIGHT: 150 LBS | BODY MASS INDEX: 24.96 KG/M2 | DIASTOLIC BLOOD PRESSURE: 72 MMHG | SYSTOLIC BLOOD PRESSURE: 118 MMHG

## 2020-01-02 DIAGNOSIS — Z01.419 WELL WOMAN EXAM WITH ROUTINE GYNECOLOGICAL EXAM: ICD-10-CM

## 2020-01-02 DIAGNOSIS — Z12.4 SCREENING FOR CERVICAL CANCER: ICD-10-CM

## 2020-01-02 DIAGNOSIS — Z11.51 SCREENING FOR HUMAN PAPILLOMAVIRUS (HPV): ICD-10-CM

## 2020-01-02 DIAGNOSIS — Z11.3 SCREENING EXAMINATION FOR VENEREAL DISEASE: ICD-10-CM

## 2020-01-02 DIAGNOSIS — N83.209 CYST OF OVARY, UNSPECIFIED LATERALITY: ICD-10-CM

## 2020-01-02 PROCEDURE — 99385 PREV VISIT NEW AGE 18-39: CPT | Performed by: OBSTETRICS & GYNECOLOGY

## 2020-01-02 PROCEDURE — 88175 CYTOPATH C/V AUTO FLUID REDO: CPT

## 2020-01-02 PROCEDURE — 76856 US EXAM PELVIC COMPLETE: CPT

## 2020-01-02 PROCEDURE — 87491 CHLMYD TRACH DNA AMP PROBE: CPT

## 2020-01-02 PROCEDURE — 87624 HPV HI-RISK TYP POOLED RSLT: CPT

## 2020-01-02 PROCEDURE — 87591 N.GONORRHOEAE DNA AMP PROB: CPT

## 2020-01-02 RX ORDER — IBUPROFEN 200 MG
200 TABLET ORAL EVERY 6 HOURS PRN
COMMUNITY

## 2020-01-02 RX ORDER — CETIRIZINE HYDROCHLORIDE 10 MG/1
10 TABLET ORAL DAILY
COMMUNITY

## 2020-01-02 NOTE — NON-PROVIDER
Pt is here for an annual exam  She recently had an appendectomy and on her CT it showed that she has a cyst on her ovary  She would like to discuss her imaging  She is due for a pap  No history of abnormals

## 2020-01-02 NOTE — PROGRESS NOTES
Subjective:      Malia Delgado is a 34 y.o. female who presents with Gynecologic Exam (Annual)        CC: annual exam    HPI: 34-year-old  0 last menstrual period 2019, not using contraception, presents for annual exam.  She recently had a laparoscopic appendectomy on  for acute appendicitis.  She was told that the CT scan showed ovarian cysts.  She is also concerned because she had a positive MHA-TP test while donating blood from 2019.  They did not perform an RPR.  She states she has no history of syphilis.  She has no history of genital ulcers or gummas.  No history of abnormal Pap smears or STDs.  Family history is negative for breast cancer.    Past Medical History:   Diagnosis Date   • Anxiety        Past Surgical History:   Procedure Laterality Date   • PB LAP,APPENDECTOMY  2019    Procedure: APPENDECTOMY, LAPAROSCOPIC;  Surgeon: Lewis Perez M.D.;  Location: SURGERY [facility];  Service: General         Current Outpatient Medications:   •  cetirizine (ZYRTEC) 10 MG Tab, Take 10 mg by mouth every day., Disp: , Rfl:   •  Melatonin 1 MG Cap, Take  by mouth., Disp: , Rfl:   •  ibuprofen (MOTRIN) 200 MG Tab, Take 200 mg by mouth every 6 hours as needed., Disp: , Rfl:     Patient has no known allergies.    Family History   Problem Relation Age of Onset   • Diabetes Mother    • Arthritis Mother    • Hypertension Mother    • No Known Problems Father    • No Known Problems Sister        Social History     Socioeconomic History   • Marital status: Single     Spouse name: Not on file   • Number of children: Not on file   • Years of education: Not on file   • Highest education level: Not on file   Occupational History   • Not on file   Social Needs   • Financial resource strain: Not on file   • Food insecurity:     Worry: Not on file     Inability: Not on file   • Transportation needs:     Medical: Not on file     Non-medical: Not on file   Tobacco Use   • Smoking  status: Never Smoker   • Smokeless tobacco: Never Used   Substance and Sexual Activity   • Alcohol use: Yes     Alcohol/week: 0.0 oz     Frequency: Monthly or less     Comment: social   • Drug use: Never   • Sexual activity: Yes     Partners: Male   Lifestyle   • Physical activity:     Days per week: Not on file     Minutes per session: Not on file   • Stress: Not on file   Relationships   • Social connections:     Talks on phone: Not on file     Gets together: Not on file     Attends Yarsani service: Not on file     Active member of club or organization: Not on file     Attends meetings of clubs or organizations: Not on file     Relationship status: Not on file   • Intimate partner violence:     Fear of current or ex partner: Not on file     Emotionally abused: Not on file     Physically abused: Not on file     Forced sexual activity: Not on file   Other Topics Concern   • Not on file   Social History Narrative   • Not on file       OB History    Para Term  AB Living   0 0 0 0 0 0   SAB TAB Ectopic Molar Multiple Live Births   0 0 0 0 0 0       ROS REVIEW OF SYSTEMS:    Pertinent positives and negatives mentioned in HPI.    All other systems reviewed and are negative or noncontributory.       Objective:     /72   Wt 68 kg (150 lb)   BMI 24.96 kg/m²      Physical Exam      GENERAL: Alert, in no apparent distress  PSYCHIATRIC: Appropriate affect, intact insight and judgement.  NECK:  Nontender, no masses.  No Thyromegaly or nodules. No lymphadenopathy.  RESPIRATORY: Normal respiratory effort.  Lungs clear to auscultation.   CARDIOVASCULAR: RRR, no murmur, gallop, or rub.  ABDOMEN: Soft, nontender, nondistended.  No palpable masses.  No rebound or guarding.  No inguinal lymphadenopathy.  No hepatosplenomegaly.  No hernias.  BACK: No CVA tenderness  EXTREMITIES: No edema  SKIN: No rash    BREAST: No masses or tenderness.  No skin changes.  No nipple inversion or discharge. No axillary  lymphadenopathy.      GENITOURINARY:  Normal external genitalia, no lesions.  Normal urethral meatus, no masses or tenderness.  Normal bladder without fullness or masses.  Vagina well estrogenized, thick yellow-white discharge is present.  Cervix without lesions or discharge, nontender.  Uterus normal size, shape, and contour, nontender.  Adnexa nontender, no masses.  Normal anus and perineum.    Rectal Exam - not indicated.       Assessment/Plan:       1. Well woman exam with routine gynecological exam  Reviewed monthly self breast exams and need for yearly screening mammograms starting at age 40.  Discussed calcium intake, Vitamin D,  and weight bearing exercise for bone health.  Declines contraception.    2. Screening for cervical cancer  - THINPREP PAP W/HPV AND CTNG; Future    3. Screening for human papillomavirus (HPV)  - THINPREP PAP W/HPV AND CTNG; Future    4. Screening examination for venereal disease  Patient with positive MHA-TP test while donating blood.  They did not perform an RPR.  She is asymptomatic, and has no history of syphilis or treatment for syphilis.  A repeat T palladium antibody was ordered, and RPR with titer.  Will call with results and further management.  We explained that if the tests are positive, she may need IM penicillin up to 3 doses.  - T.PALLIDUM AB EIA; Future  - RPR TITER/CONFIRM MHA-TP (AR); Future    5. Cyst of ovary, unspecified laterality  CT report reviewed -an involuted cyst is mentioned, but measurements were not given.  Pelvic ultrasound ordered for follow-up.  - US-PELVIC COMPLETE (TRANSABDOMINAL/TRANSVAGINAL) (COMBO); Future    Will call with results and further management.

## 2020-01-03 LAB
C TRACH DNA GENITAL QL NAA+PROBE: NEGATIVE
CYTOLOGY REG CYTOL: NORMAL
HPV HR 12 DNA CVX QL NAA+PROBE: NEGATIVE
HPV16 DNA SPEC QL NAA+PROBE: NEGATIVE
HPV18 DNA SPEC QL NAA+PROBE: NEGATIVE
N GONORRHOEA DNA GENITAL QL NAA+PROBE: NEGATIVE
SPECIMEN SOURCE: NORMAL
SPECIMEN SOURCE: NORMAL

## 2020-01-15 ENCOUNTER — TELEPHONE (OUTPATIENT)
Dept: OBGYN | Facility: CLINIC | Age: 35
End: 2020-01-15

## 2020-01-15 NOTE — TELEPHONE ENCOUNTER
01/15/20 2:02 PM  Pt returned call and results were given.    01/15/20 1:51 PM  Pt informed of below results, pt also wanted results from her RPR titer. Found on [facility], Dr. Angel signed off and LM for pt to call back for results.    ----- Message from Brenda Morel M.D. sent at 1/6/2020  8:26 AM PST -----  Please call patient and inform her the US showed two small hemorrhagic cysts, likely related to ovulation.  They should resolve over time, and no further f/u is needed.

## 2020-11-03 ENCOUNTER — HOSPITAL ENCOUNTER (OUTPATIENT)
Dept: LAB | Facility: MEDICAL CENTER | Age: 35
End: 2020-11-03
Payer: COMMERCIAL

## 2020-11-03 PROCEDURE — U0003 INFECTIOUS AGENT DETECTION BY NUCLEIC ACID (DNA OR RNA); SEVERE ACUTE RESPIRATORY SYNDROME CORONAVIRUS 2 (SARS-COV-2) (CORONAVIRUS DISEASE [COVID-19]), AMPLIFIED PROBE TECHNIQUE, MAKING USE OF HIGH THROUGHPUT TECHNOLOGIES AS DESCRIBED BY CMS-2020-01-R: HCPCS

## 2020-11-04 LAB — COVID ORDER STATUS COVID19: NORMAL

## 2020-11-05 LAB
SARS-COV-2 RNA RESP QL NAA+PROBE: NOTDETECTED
SPECIMEN SOURCE: NORMAL

## 2021-08-03 ENCOUNTER — TELEPHONE (OUTPATIENT)
Dept: SCHEDULING | Facility: IMAGING CENTER | Age: 36
End: 2021-08-03

## 2021-08-04 ENCOUNTER — OFFICE VISIT (OUTPATIENT)
Dept: MEDICAL GROUP | Facility: LAB | Age: 36
End: 2021-08-04
Payer: COMMERCIAL

## 2021-08-04 VITALS
DIASTOLIC BLOOD PRESSURE: 80 MMHG | TEMPERATURE: 98.3 F | BODY MASS INDEX: 25.66 KG/M2 | HEART RATE: 98 BPM | WEIGHT: 154 LBS | SYSTOLIC BLOOD PRESSURE: 118 MMHG | HEIGHT: 65 IN | OXYGEN SATURATION: 97 % | RESPIRATION RATE: 12 BRPM

## 2021-08-04 DIAGNOSIS — Z00.00 WELL ADULT EXAM: ICD-10-CM

## 2021-08-04 DIAGNOSIS — R00.0 TACHYCARDIA: ICD-10-CM

## 2021-08-04 DIAGNOSIS — F41.9 ANXIETY: ICD-10-CM

## 2021-08-04 PROBLEM — R16.0 HEPATOMEGALY: Status: ACTIVE | Noted: 2021-08-04

## 2021-08-04 PROBLEM — N83.209 OVARIAN CYST: Status: ACTIVE | Noted: 2021-08-04

## 2021-08-04 PROBLEM — K57.90 DIVERTICULOSIS: Status: ACTIVE | Noted: 2021-08-04

## 2021-08-04 PROCEDURE — 99395 PREV VISIT EST AGE 18-39: CPT | Performed by: FAMILY MEDICINE

## 2021-08-04 RX ORDER — GUAIFENESIN/EPHEDRINE HCL 200-12.5MG
TABLET ORAL
COMMUNITY

## 2021-08-04 ASSESSMENT — FIBROSIS 4 INDEX: FIB4 SCORE: 0.59

## 2021-08-04 NOTE — LETTER
[facility]  Lewis Menendez M.D.  [address]  [place] [address]  Fax: [phone]   Authorization for Release/Disclosure of   Protected Health Information   Name: MALIA CHERRY : 1985 SSN: [id number]   Address: [address] [place] [address] [address] [address]  [place] [address] Phone:    [phone] (home)    I authorize the entity listed below to release/disclose the PHI below to:   [facility]/Lewis Menendez M.D. and Lewis Menendez M.D.   Provider or Entity Name:  [facility]    Address   City, State, Zip   Phone:      Fax:     Reason for request: continuity of care   Information to be released:    [  ] LAST COLONOSCOPY,  including any PATH REPORT and follow-up  [  ] LAST FIT/COLOGUARD RESULT [  ] LAST DEXA  [  ] LAST MAMMOGRAM  [  ] LAST PAP  [  ] LAST LABS [  ] RETINA EXAM REPORT  [  ] IMMUNIZATION RECORDS  [XXXXX] Release all info      [XXXXX] Check here and initial the line next to each item to release ALL health information INCLUDING  _XXXXX_ Care and treatment for drug and / or alcohol abuse  _XXXXX_ HIV testing, infection status, or AIDS  _XXXXX_ Genetic Testing    DATES OF SERVICE OR TIME PERIOD TO BE DISCLOSED: _____________  I understand and acknowledge that:  * This Authorization may be revoked at any time by you in writing, except if your health information has already been used or disclosed.  * Your health information that will be used or disclosed as a result of you signing this authorization could be re-disclosed by the recipient. If this occurs, your re-disclosed health information may no longer be protected by State or Federal laws.  * You may refuse to sign this Authorization. Your refusal will not affect your ability to obtain treatment.  * This Authorization becomes effective upon signing and will  on (date) __________.      If no date is indicated, this Authorization will  one (1) year from the signature date.    Name: Malia  Portia Delgado    Signature:   Date:     8/4/2021       PLEASE FAX REQUESTED RECORDS BACK TO: [phone]

## 2021-08-04 NOTE — PROGRESS NOTES
"Malia Delgado is a 36 y.o. female here to establish care and for annual visit.    HPI:      Health maintenance  Pap up to date  Due for tdap but needs to get covid vaccine for work soon  Working on regular besides a healthy diet.  Rare alcohol.  Non-smoker.  Currently controlling anxiety with lifestyle changes and 5 HTP, was previously on medication for this  Reports chronic tachycardia that may be triggered by anxiety.  No chest pain or shortness of breath.    Current medicines (including changes today)  Current Outpatient Medications   Medication Sig Dispense Refill   • 5-Hydroxytryptophan (5-HTP) 100 MG Cap Take  by mouth.     • cetirizine (ZYRTEC) 10 MG Tab Take 10 mg by mouth every day.     • Melatonin 1 MG Cap Take  by mouth.     • ibuprofen (MOTRIN) 200 MG Tab Take 200 mg by mouth every 6 hours as needed.       No current facility-administered medications for this visit.     She  has a past medical history of Anxiety.  She  has a past surgical history that includes pr lap,appendectomy (12/16/2019).  Social History     Tobacco Use   • Smoking status: Never Smoker   • Smokeless tobacco: Never Used   Vaping Use   • Vaping Use: Never used   Substance Use Topics   • Alcohol use: Yes     Alcohol/week: 0.0 oz     Comment: social   • Drug use: Never     Social History     Social History Narrative   • Not on file     Family History   Problem Relation Age of Onset   • Diabetes Mother    • Arthritis Mother    • Hypertension Mother    • No Known Problems Father    • No Known Problems Sister      Family Status   Relation Name Status   • Mo  Alive   • Fa  Alive   • Sis  Alive       ROS  See HPI     Objective:     Physical Exam:  /80 (BP Location: Left arm, Patient Position: Sitting, BP Cuff Size: Adult)   Pulse 98   Temp 36.8 °C (98.3 °F)   Resp 12   Ht 1.651 m (5' 5\")   Wt 69.9 kg (154 lb)   SpO2 97%  Body mass index is 25.63 kg/m².  Constitutional: Alert. Well appearing. No distress.  Skin: Warm, dry, " good turgor, no visible rashes.  Eye: Equal, round and reactive to light, conjunctiva clear, lids normal.  ENMT: Moist mucous membranes.   Respiratory: Normal effort. Lungs are clear to auscultation bilaterally.  Cardiovascular: Regular rate and rhythm. Normal S1/S2. No murmurs, rubs or gallops.   Neuro: Moves all four extremities. No facial droop.  Psych: Answers questions appropriately. Normal affect and mood.    Assessment and Plan:     1. Well adult exam  Health maintenance reviewed and updated.  Age-appropriate anticipatory guidance provided.  Holding on Tdap today as she needs to get Covid vaccine soon.  Labs as below.  - CBC WITH DIFFERENTIAL; Future  - Comp Metabolic Panel; Future  - Lipid Profile; Future    2. Anxiety  She is currently controlling with lifestyle measures and 5 HTP.    3. Tachycardia  She reports chronic issue, asymptomatic.  Checking TSH.  - TSH WITH REFLEX TO FT4; Future    Follow up: Return in about 1 year (around 8/4/2022).         PLEASE NOTE: This note was created using voice recognition software.

## 2021-08-05 ENCOUNTER — HOSPITAL ENCOUNTER (OUTPATIENT)
Dept: LAB | Facility: MEDICAL CENTER | Age: 36
End: 2021-08-05
Attending: FAMILY MEDICINE
Payer: COMMERCIAL

## 2021-08-05 DIAGNOSIS — R00.0 TACHYCARDIA: ICD-10-CM

## 2021-08-05 DIAGNOSIS — Z00.00 WELL ADULT EXAM: ICD-10-CM

## 2021-08-05 LAB
ALBUMIN SERPL BCP-MCNC: 4.5 G/DL (ref 3.2–4.9)
ALBUMIN/GLOB SERPL: 1.9 G/DL
ALP SERPL-CCNC: 56 U/L (ref 30–99)
ALT SERPL-CCNC: 20 U/L (ref 2–50)
ANION GAP SERPL CALC-SCNC: 11 MMOL/L (ref 7–16)
AST SERPL-CCNC: 14 U/L (ref 12–45)
BASOPHILS # BLD AUTO: 1 % (ref 0–1.8)
BASOPHILS # BLD: 0.07 K/UL (ref 0–0.12)
BILIRUB SERPL-MCNC: 0.6 MG/DL (ref 0.1–1.5)
BUN SERPL-MCNC: 13 MG/DL (ref 8–22)
CALCIUM SERPL-MCNC: 9.3 MG/DL (ref 8.5–10.5)
CHLORIDE SERPL-SCNC: 104 MMOL/L (ref 96–112)
CHOLEST SERPL-MCNC: 193 MG/DL (ref 100–199)
CO2 SERPL-SCNC: 22 MMOL/L (ref 20–33)
CREAT SERPL-MCNC: 0.87 MG/DL (ref 0.5–1.4)
EOSINOPHIL # BLD AUTO: 0.37 K/UL (ref 0–0.51)
EOSINOPHIL NFR BLD: 5.1 % (ref 0–6.9)
ERYTHROCYTE [DISTWIDTH] IN BLOOD BY AUTOMATED COUNT: 41.4 FL (ref 35.9–50)
FASTING STATUS PATIENT QL REPORTED: NORMAL
GLOBULIN SER CALC-MCNC: 2.4 G/DL (ref 1.9–3.5)
GLUCOSE SERPL-MCNC: 101 MG/DL (ref 65–99)
HCT VFR BLD AUTO: 44.5 % (ref 37–47)
HDLC SERPL-MCNC: 68 MG/DL
HGB BLD-MCNC: 14.5 G/DL (ref 12–16)
IMM GRANULOCYTES # BLD AUTO: 0.02 K/UL (ref 0–0.11)
IMM GRANULOCYTES NFR BLD AUTO: 0.3 % (ref 0–0.9)
LDLC SERPL CALC-MCNC: 105 MG/DL
LYMPHOCYTES # BLD AUTO: 2.02 K/UL (ref 1–4.8)
LYMPHOCYTES NFR BLD: 27.6 % (ref 22–41)
MCH RBC QN AUTO: 29.3 PG (ref 27–33)
MCHC RBC AUTO-ENTMCNC: 32.6 G/DL (ref 33.6–35)
MCV RBC AUTO: 89.9 FL (ref 81.4–97.8)
MONOCYTES # BLD AUTO: 0.5 K/UL (ref 0–0.85)
MONOCYTES NFR BLD AUTO: 6.8 % (ref 0–13.4)
NEUTROPHILS # BLD AUTO: 4.34 K/UL (ref 2–7.15)
NEUTROPHILS NFR BLD: 59.2 % (ref 44–72)
NRBC # BLD AUTO: 0 K/UL
NRBC BLD-RTO: 0 /100 WBC
PLATELET # BLD AUTO: 299 K/UL (ref 164–446)
PMV BLD AUTO: 9.2 FL (ref 9–12.9)
POTASSIUM SERPL-SCNC: 4.4 MMOL/L (ref 3.6–5.5)
PROT SERPL-MCNC: 6.9 G/DL (ref 6–8.2)
RBC # BLD AUTO: 4.95 M/UL (ref 4.2–5.4)
SODIUM SERPL-SCNC: 137 MMOL/L (ref 135–145)
TRIGL SERPL-MCNC: 99 MG/DL (ref 0–149)
TSH SERPL DL<=0.005 MIU/L-ACNC: 0.96 UIU/ML (ref 0.38–5.33)
WBC # BLD AUTO: 7.3 K/UL (ref 4.8–10.8)

## 2021-08-05 PROCEDURE — 36415 COLL VENOUS BLD VENIPUNCTURE: CPT

## 2021-08-05 PROCEDURE — 80053 COMPREHEN METABOLIC PANEL: CPT

## 2021-08-05 PROCEDURE — 85025 COMPLETE CBC W/AUTO DIFF WBC: CPT

## 2021-08-05 PROCEDURE — 84443 ASSAY THYROID STIM HORMONE: CPT

## 2021-08-05 PROCEDURE — 80061 LIPID PANEL: CPT

## 2022-06-03 ENCOUNTER — OFFICE VISIT (OUTPATIENT)
Dept: URGENT CARE | Facility: CLINIC | Age: 37
End: 2022-06-03
Payer: COMMERCIAL

## 2022-06-03 ENCOUNTER — HOSPITAL ENCOUNTER (OUTPATIENT)
Facility: MEDICAL CENTER | Age: 37
End: 2022-06-03
Attending: FAMILY MEDICINE
Payer: COMMERCIAL

## 2022-06-03 VITALS
SYSTOLIC BLOOD PRESSURE: 118 MMHG | DIASTOLIC BLOOD PRESSURE: 74 MMHG | WEIGHT: 156.8 LBS | HEART RATE: 78 BPM | BODY MASS INDEX: 26.12 KG/M2 | HEIGHT: 65 IN | OXYGEN SATURATION: 96 % | TEMPERATURE: 98 F | RESPIRATION RATE: 16 BRPM

## 2022-06-03 DIAGNOSIS — J98.8 RTI (RESPIRATORY TRACT INFECTION): ICD-10-CM

## 2022-06-03 LAB
INT CON NEG: NORMAL
INT CON POS: NORMAL
S PYO AG THROAT QL: NEGATIVE

## 2022-06-03 PROCEDURE — 99213 OFFICE O/P EST LOW 20 MIN: CPT | Performed by: FAMILY MEDICINE

## 2022-06-03 PROCEDURE — 87880 STREP A ASSAY W/OPTIC: CPT | Performed by: FAMILY MEDICINE

## 2022-06-03 PROCEDURE — 0240U HCHG SARS-COV-2 COVID-19 NFCT DS RESP RNA 3 TRGT MIC: CPT

## 2022-06-03 RX ORDER — TIZANIDINE 4 MG/1
TABLET ORAL
COMMUNITY
Start: 2022-04-28

## 2022-06-03 ASSESSMENT — ENCOUNTER SYMPTOMS: SORE THROAT: 1

## 2022-06-03 ASSESSMENT — FIBROSIS 4 INDEX: FIB4 SCORE: 0.36

## 2022-06-03 NOTE — PROGRESS NOTES
"Subjective     Malia Delgado is a 36 y.o. female who presents with Pharyngitis (2x days, nasal congestion, fatigue )      - This is a pleasant and nontoxic appearing 36 y.o. female who has come to the walk-in clinic today for:    #1) ~2 days w/ sore throat and stuffy/runny nose. No NVFC/cough.       ALLERGIES:  Patient has no known allergies.     PMH:  Past Medical History:   Diagnosis Date   • Anxiety         PSH:  Past Surgical History:   Procedure Laterality Date   • AK LAP,APPENDECTOMY  12/16/2019    Procedure: APPENDECTOMY, LAPAROSCOPIC;  Surgeon: Lewis Perez M.D.;  Location: SURGERY [facility];  Service: General       MEDS:    Current Outpatient Medications:   •  tizanidine (ZANAFLEX) 4 MG Tab, TAKE 1 TO 2 TABLETS BY MOUTH AT BEDTIME, Disp: , Rfl:   •  5-Hydroxytryptophan (5-HTP) 100 MG Cap, Take  by mouth., Disp: , Rfl:   •  cetirizine (ZYRTEC) 10 MG Tab, Take 10 mg by mouth every day., Disp: , Rfl:   •  Melatonin 1 MG Cap, Take  by mouth., Disp: , Rfl:   •  ibuprofen (MOTRIN) 200 MG Tab, Take 200 mg by mouth every 6 hours as needed., Disp: , Rfl:     ** I have documented what I find to be significant in regards to past medical, social, family and surgical history  in my HPI or under PMH/PSH/FH review section, otherwise it is noncontributory **           HPI    Review of Systems   HENT: Positive for congestion and sore throat.    All other systems reviewed and are negative.             Objective     /74 (BP Location: Left arm, Patient Position: Sitting, BP Cuff Size: Adult)   Pulse 78   Temp 36.7 °C (98 °F) (Temporal)   Resp 16   Ht 1.651 m (5' 5\")   Wt 71.1 kg (156 lb 12.8 oz)   SpO2 96%   BMI 26.09 kg/m²      Physical Exam  Vitals and nursing note reviewed.   Constitutional:       General: She is not in acute distress.     Appearance: Normal appearance. She is well-developed.   HENT:      Head: Normocephalic.      Mouth/Throat:      Mouth: Mucous membranes are moist.      " Pharynx: Oropharynx is clear. Posterior oropharyngeal erythema present. No oropharyngeal exudate.   Cardiovascular:      Heart sounds: Normal heart sounds. No murmur heard.  Pulmonary:      Effort: Pulmonary effort is normal. No respiratory distress.      Breath sounds: Normal breath sounds.   Musculoskeletal:      Cervical back: No tenderness.   Lymphadenopathy:      Cervical: No cervical adenopathy.   Neurological:      Mental Status: She is alert.      Motor: No abnormal muscle tone.   Psychiatric:         Mood and Affect: Mood normal.         Behavior: Behavior normal.         Assessment & Plan       1. RTI (respiratory tract infection)  CoV-2 and Flu A/B by PCR (24 hour In-House): Collect [facility] swab [facility]    POCT Rapid Strep A     * viral    - Dx, plan & d/c instructions discussed   - Rest, stay hydrated, OTC Motrin and/or Tylenol as needed  - E.R. precautions discussed     Asked to kindly follow up with their PCP's office for a recheck, ER if not improving or feeling/getting worse. If you do not have a primary care doctor and need to schedule one you may call [facility] at [phone] to do this.    Any realistic side effects of medications that may have been given today reviewed.     Patient left in stable condition     POCT results reviewed/discussed

## 2022-06-04 DIAGNOSIS — J98.8 RTI (RESPIRATORY TRACT INFECTION): ICD-10-CM

## 2022-06-05 LAB
FLUAV RNA SPEC QL NAA+PROBE: NEGATIVE
FLUBV RNA SPEC QL NAA+PROBE: NEGATIVE
SARS-COV-2 RNA RESP QL NAA+PROBE: NOTDETECTED
SPECIMEN SOURCE: NORMAL

## 2024-12-23 ENCOUNTER — HOSPITAL ENCOUNTER (OUTPATIENT)
Dept: RADIOLOGY | Facility: MEDICAL CENTER | Age: 39
End: 2024-12-23
Attending: NURSE PRACTITIONER
Payer: COMMERCIAL

## 2024-12-23 ENCOUNTER — OFFICE VISIT (OUTPATIENT)
Dept: URGENT CARE | Facility: CLINIC | Age: 39
End: 2024-12-23
Payer: COMMERCIAL

## 2024-12-23 VITALS
WEIGHT: 156 LBS | SYSTOLIC BLOOD PRESSURE: 122 MMHG | TEMPERATURE: 97.5 F | BODY MASS INDEX: 25.99 KG/M2 | HEART RATE: 92 BPM | DIASTOLIC BLOOD PRESSURE: 65 MMHG | OXYGEN SATURATION: 98 % | HEIGHT: 65 IN

## 2024-12-23 DIAGNOSIS — K57.92 DIVERTICULITIS: ICD-10-CM

## 2024-12-23 DIAGNOSIS — R10.32 LEFT LOWER QUADRANT ABDOMINAL PAIN: ICD-10-CM

## 2024-12-23 PROCEDURE — 74177 CT ABD & PELVIS W/CONTRAST: CPT

## 2024-12-23 PROCEDURE — 3078F DIAST BP <80 MM HG: CPT | Performed by: NURSE PRACTITIONER

## 2024-12-23 PROCEDURE — 99213 OFFICE O/P EST LOW 20 MIN: CPT | Performed by: NURSE PRACTITIONER

## 2024-12-23 PROCEDURE — 3074F SYST BP LT 130 MM HG: CPT | Performed by: NURSE PRACTITIONER

## 2024-12-23 PROCEDURE — 700117 HCHG RX CONTRAST REV CODE 255: Performed by: NURSE PRACTITIONER

## 2024-12-23 RX ADMIN — IOHEXOL 100 ML: 350 INJECTION, SOLUTION INTRAVENOUS at 17:16

## 2024-12-23 ASSESSMENT — ENCOUNTER SYMPTOMS: ABDOMINAL PAIN: 1

## 2024-12-30 SDOH — HEALTH STABILITY: PHYSICAL HEALTH: ON AVERAGE, HOW MANY DAYS PER WEEK DO YOU ENGAGE IN MODERATE TO STRENUOUS EXERCISE (LIKE A BRISK WALK)?: 7 DAYS

## 2024-12-30 SDOH — HEALTH STABILITY: PHYSICAL HEALTH: ON AVERAGE, HOW MANY MINUTES DO YOU ENGAGE IN EXERCISE AT THIS LEVEL?: 20 MIN

## 2024-12-30 SDOH — ECONOMIC STABILITY: HOUSING INSECURITY
IN THE LAST 12 MONTHS, WAS THERE A TIME WHEN YOU DID NOT HAVE A STEADY PLACE TO SLEEP OR SLEPT IN A SHELTER (INCLUDING NOW)?: NO

## 2024-12-30 SDOH — ECONOMIC STABILITY: FOOD INSECURITY: WITHIN THE PAST 12 MONTHS, YOU WORRIED THAT YOUR FOOD WOULD RUN OUT BEFORE YOU GOT MONEY TO BUY MORE.: NEVER TRUE

## 2024-12-30 SDOH — ECONOMIC STABILITY: TRANSPORTATION INSECURITY
IN THE PAST 12 MONTHS, HAS LACK OF RELIABLE TRANSPORTATION KEPT YOU FROM MEDICAL APPOINTMENTS, MEETINGS, WORK OR FROM GETTING THINGS NEEDED FOR DAILY LIVING?: NO

## 2024-12-30 SDOH — ECONOMIC STABILITY: INCOME INSECURITY: IN THE LAST 12 MONTHS, WAS THERE A TIME WHEN YOU WERE NOT ABLE TO PAY THE MORTGAGE OR RENT ON TIME?: NO

## 2024-12-30 SDOH — ECONOMIC STABILITY: FOOD INSECURITY: WITHIN THE PAST 12 MONTHS, THE FOOD YOU BOUGHT JUST DIDN'T LAST AND YOU DIDN'T HAVE MONEY TO GET MORE.: NEVER TRUE

## 2024-12-30 SDOH — ECONOMIC STABILITY: TRANSPORTATION INSECURITY
IN THE PAST 12 MONTHS, HAS THE LACK OF TRANSPORTATION KEPT YOU FROM MEDICAL APPOINTMENTS OR FROM GETTING MEDICATIONS?: NO

## 2024-12-30 SDOH — HEALTH STABILITY: MENTAL HEALTH
STRESS IS WHEN SOMEONE FEELS TENSE, NERVOUS, ANXIOUS, OR CAN'T SLEEP AT NIGHT BECAUSE THEIR MIND IS TROUBLED. HOW STRESSED ARE YOU?: RATHER MUCH

## 2024-12-30 SDOH — ECONOMIC STABILITY: INCOME INSECURITY: HOW HARD IS IT FOR YOU TO PAY FOR THE VERY BASICS LIKE FOOD, HOUSING, MEDICAL CARE, AND HEATING?: NOT HARD AT ALL

## 2024-12-30 SDOH — ECONOMIC STABILITY: TRANSPORTATION INSECURITY
IN THE PAST 12 MONTHS, HAS LACK OF TRANSPORTATION KEPT YOU FROM MEETINGS, WORK, OR FROM GETTING THINGS NEEDED FOR DAILY LIVING?: NO

## 2024-12-30 ASSESSMENT — SOCIAL DETERMINANTS OF HEALTH (SDOH)
HOW OFTEN DO YOU HAVE SIX OR MORE DRINKS ON ONE OCCASION: NEVER
HOW OFTEN DO YOU GET TOGETHER WITH FRIENDS OR RELATIVES?: ONCE A WEEK
HOW OFTEN DO YOU ATTENT MEETINGS OF THE CLUB OR ORGANIZATION YOU BELONG TO?: NEVER
HOW OFTEN DO YOU HAVE A DRINK CONTAINING ALCOHOL: 2-4 TIMES A MONTH
DO YOU BELONG TO ANY CLUBS OR ORGANIZATIONS SUCH AS CHURCH GROUPS UNIONS, FRATERNAL OR ATHLETIC GROUPS, OR SCHOOL GROUPS?: NO
ARE YOU MARRIED, WIDOWED, DIVORCED, SEPARATED, NEVER MARRIED, OR LIVING WITH A PARTNER?: LIVING WITH PARTNER
HOW OFTEN DO YOU ATTEND CHURCH OR RELIGIOUS SERVICES?: NEVER
WITHIN THE PAST 12 MONTHS, YOU WORRIED THAT YOUR FOOD WOULD RUN OUT BEFORE YOU GOT THE MONEY TO BUY MORE: NEVER TRUE
HOW HARD IS IT FOR YOU TO PAY FOR THE VERY BASICS LIKE FOOD, HOUSING, MEDICAL CARE, AND HEATING?: NOT HARD AT ALL
HOW OFTEN DO YOU GET TOGETHER WITH FRIENDS OR RELATIVES?: ONCE A WEEK
IN A TYPICAL WEEK, HOW MANY TIMES DO YOU TALK ON THE PHONE WITH FAMILY, FRIENDS, OR NEIGHBORS?: MORE THAN THREE TIMES A WEEK
HOW MANY DRINKS CONTAINING ALCOHOL DO YOU HAVE ON A TYPICAL DAY WHEN YOU ARE DRINKING: 1 OR 2
HOW OFTEN DO YOU ATTENT MEETINGS OF THE CLUB OR ORGANIZATION YOU BELONG TO?: NEVER
IN A TYPICAL WEEK, HOW MANY TIMES DO YOU TALK ON THE PHONE WITH FAMILY, FRIENDS, OR NEIGHBORS?: MORE THAN THREE TIMES A WEEK
HOW OFTEN DO YOU ATTEND CHURCH OR RELIGIOUS SERVICES?: NEVER
ARE YOU MARRIED, WIDOWED, DIVORCED, SEPARATED, NEVER MARRIED, OR LIVING WITH A PARTNER?: LIVING WITH PARTNER
DO YOU BELONG TO ANY CLUBS OR ORGANIZATIONS SUCH AS CHURCH GROUPS UNIONS, FRATERNAL OR ATHLETIC GROUPS, OR SCHOOL GROUPS?: NO
IN THE PAST 12 MONTHS, HAS THE ELECTRIC, GAS, OIL, OR WATER COMPANY THREATENED TO SHUT OFF SERVICE IN YOUR HOME?: NO

## 2024-12-30 ASSESSMENT — LIFESTYLE VARIABLES
SKIP TO QUESTIONS 9-10: 1
HOW OFTEN DO YOU HAVE SIX OR MORE DRINKS ON ONE OCCASION: NEVER
HOW OFTEN DO YOU HAVE A DRINK CONTAINING ALCOHOL: 2-4 TIMES A MONTH
HOW MANY STANDARD DRINKS CONTAINING ALCOHOL DO YOU HAVE ON A TYPICAL DAY: 1 OR 2
AUDIT-C TOTAL SCORE: 2

## 2024-12-31 ENCOUNTER — OFFICE VISIT (OUTPATIENT)
Dept: MEDICAL GROUP | Facility: LAB | Age: 39
End: 2024-12-31
Payer: COMMERCIAL

## 2024-12-31 ENCOUNTER — HOSPITAL ENCOUNTER (OUTPATIENT)
Dept: LAB | Facility: MEDICAL CENTER | Age: 39
End: 2024-12-31
Attending: STUDENT IN AN ORGANIZED HEALTH CARE EDUCATION/TRAINING PROGRAM
Payer: COMMERCIAL

## 2024-12-31 VITALS
HEART RATE: 84 BPM | WEIGHT: 152 LBS | HEIGHT: 65 IN | BODY MASS INDEX: 25.33 KG/M2 | TEMPERATURE: 98 F | OXYGEN SATURATION: 97 % | RESPIRATION RATE: 16 BRPM | SYSTOLIC BLOOD PRESSURE: 102 MMHG | DIASTOLIC BLOOD PRESSURE: 64 MMHG

## 2024-12-31 DIAGNOSIS — Z11.4 SCREENING FOR HIV WITHOUT PRESENCE OF RISK FACTORS: ICD-10-CM

## 2024-12-31 DIAGNOSIS — F41.9 ANXIETY: ICD-10-CM

## 2024-12-31 DIAGNOSIS — R73.01 IMPAIRED FASTING BLOOD SUGAR: ICD-10-CM

## 2024-12-31 DIAGNOSIS — E78.00 ELEVATED LDL CHOLESTEROL LEVEL: ICD-10-CM

## 2024-12-31 DIAGNOSIS — K57.92 DIVERTICULITIS: ICD-10-CM

## 2024-12-31 DIAGNOSIS — Z13.21 ENCOUNTER FOR VITAMIN DEFICIENCY SCREENING: ICD-10-CM

## 2024-12-31 DIAGNOSIS — R53.82 CHRONIC FATIGUE: ICD-10-CM

## 2024-12-31 DIAGNOSIS — K57.90 DIVERTICULOSIS: ICD-10-CM

## 2024-12-31 DIAGNOSIS — F34.1 DYSTHYMIA: ICD-10-CM

## 2024-12-31 DIAGNOSIS — Z11.59 NEED FOR HEPATITIS C SCREENING TEST: ICD-10-CM

## 2024-12-31 DIAGNOSIS — E27.9 ADRENAL NODULE (HCC): ICD-10-CM

## 2024-12-31 DIAGNOSIS — R93.5 ABNORMAL CT OF THE ABDOMEN: ICD-10-CM

## 2024-12-31 PROBLEM — N83.209 OVARIAN CYST: Status: RESOLVED | Noted: 2021-08-04 | Resolved: 2024-12-31

## 2024-12-31 PROBLEM — R16.0 HEPATOMEGALY: Status: RESOLVED | Noted: 2021-08-04 | Resolved: 2024-12-31

## 2024-12-31 PROBLEM — L71.9 ROSACEA: Status: ACTIVE | Noted: 2024-12-31

## 2024-12-31 LAB
25(OH)D3 SERPL-MCNC: 21 NG/ML (ref 30–100)
ALBUMIN SERPL BCP-MCNC: 4.7 G/DL (ref 3.2–4.9)
ALBUMIN/GLOB SERPL: 1.8 G/DL
ALP SERPL-CCNC: 63 U/L (ref 30–99)
ALT SERPL-CCNC: 33 U/L (ref 2–50)
ANION GAP SERPL CALC-SCNC: 12 MMOL/L (ref 7–16)
AST SERPL-CCNC: 16 U/L (ref 12–45)
BASOPHILS # BLD AUTO: 0.8 % (ref 0–1.8)
BASOPHILS # BLD: 0.08 K/UL (ref 0–0.12)
BILIRUB SERPL-MCNC: 0.4 MG/DL (ref 0.1–1.5)
BUN SERPL-MCNC: 12 MG/DL (ref 8–22)
CALCIUM ALBUM COR SERPL-MCNC: 8.8 MG/DL (ref 8.5–10.5)
CALCIUM SERPL-MCNC: 9.4 MG/DL (ref 8.5–10.5)
CHLORIDE SERPL-SCNC: 99 MMOL/L (ref 96–112)
CHOLEST SERPL-MCNC: 194 MG/DL (ref 100–199)
CO2 SERPL-SCNC: 25 MMOL/L (ref 20–33)
CREAT SERPL-MCNC: 0.82 MG/DL (ref 0.5–1.4)
EOSINOPHIL # BLD AUTO: 0.08 K/UL (ref 0–0.51)
EOSINOPHIL NFR BLD: 0.8 % (ref 0–6.9)
ERYTHROCYTE [DISTWIDTH] IN BLOOD BY AUTOMATED COUNT: 37.6 FL (ref 35.9–50)
EST. AVERAGE GLUCOSE BLD GHB EST-MCNC: 97 MG/DL
FERRITIN SERPL-MCNC: 61.7 NG/ML (ref 10–291)
GFR SERPLBLD CREATININE-BSD FMLA CKD-EPI: 94 ML/MIN/1.73 M 2
GLOBULIN SER CALC-MCNC: 2.6 G/DL (ref 1.9–3.5)
GLUCOSE SERPL-MCNC: 92 MG/DL (ref 65–99)
HBA1C MFR BLD: 5 % (ref 4–5.6)
HCT VFR BLD AUTO: 43.2 % (ref 37–47)
HCV AB SER QL: NORMAL
HDLC SERPL-MCNC: 58 MG/DL
HGB BLD-MCNC: 14.6 G/DL (ref 12–16)
HIV 1+2 AB+HIV1 P24 AG SERPL QL IA: NORMAL
IMM GRANULOCYTES # BLD AUTO: 0.05 K/UL (ref 0–0.11)
IMM GRANULOCYTES NFR BLD AUTO: 0.5 % (ref 0–0.9)
IRON SATN MFR SERPL: 29 % (ref 15–55)
IRON SERPL-MCNC: 81 UG/DL (ref 40–170)
LDLC SERPL CALC-MCNC: 118 MG/DL
LYMPHOCYTES # BLD AUTO: 1.98 K/UL (ref 1–4.8)
LYMPHOCYTES NFR BLD: 20.2 % (ref 22–41)
MCH RBC QN AUTO: 29.3 PG (ref 27–33)
MCHC RBC AUTO-ENTMCNC: 33.8 G/DL (ref 32.2–35.5)
MCV RBC AUTO: 86.7 FL (ref 81.4–97.8)
MONOCYTES # BLD AUTO: 0.63 K/UL (ref 0–0.85)
MONOCYTES NFR BLD AUTO: 6.4 % (ref 0–13.4)
NEUTROPHILS # BLD AUTO: 6.98 K/UL (ref 1.82–7.42)
NEUTROPHILS NFR BLD: 71.3 % (ref 44–72)
NRBC # BLD AUTO: 0 K/UL
NRBC BLD-RTO: 0 /100 WBC (ref 0–0.2)
PLATELET # BLD AUTO: 363 K/UL (ref 164–446)
PMV BLD AUTO: 8.9 FL (ref 9–12.9)
POTASSIUM SERPL-SCNC: 4 MMOL/L (ref 3.6–5.5)
PROT SERPL-MCNC: 7.3 G/DL (ref 6–8.2)
RBC # BLD AUTO: 4.98 M/UL (ref 4.2–5.4)
SODIUM SERPL-SCNC: 136 MMOL/L (ref 135–145)
TIBC SERPL-MCNC: 282 UG/DL (ref 250–450)
TRIGL SERPL-MCNC: 89 MG/DL (ref 0–149)
TSH SERPL DL<=0.005 MIU/L-ACNC: 1.21 UIU/ML (ref 0.38–5.33)
UIBC SERPL-MCNC: 201 UG/DL (ref 110–370)
VIT B12 SERPL-MCNC: 1098 PG/ML (ref 211–911)
WBC # BLD AUTO: 9.8 K/UL (ref 4.8–10.8)

## 2024-12-31 PROCEDURE — 83036 HEMOGLOBIN GLYCOSYLATED A1C: CPT

## 2024-12-31 PROCEDURE — 80053 COMPREHEN METABOLIC PANEL: CPT

## 2024-12-31 PROCEDURE — 86803 HEPATITIS C AB TEST: CPT

## 2024-12-31 PROCEDURE — 80061 LIPID PANEL: CPT

## 2024-12-31 PROCEDURE — 82728 ASSAY OF FERRITIN: CPT

## 2024-12-31 PROCEDURE — 83550 IRON BINDING TEST: CPT

## 2024-12-31 PROCEDURE — 82306 VITAMIN D 25 HYDROXY: CPT

## 2024-12-31 PROCEDURE — 87389 HIV-1 AG W/HIV-1&-2 AB AG IA: CPT

## 2024-12-31 PROCEDURE — 36415 COLL VENOUS BLD VENIPUNCTURE: CPT

## 2024-12-31 PROCEDURE — 82607 VITAMIN B-12: CPT

## 2024-12-31 PROCEDURE — 85025 COMPLETE CBC W/AUTO DIFF WBC: CPT

## 2024-12-31 PROCEDURE — 84443 ASSAY THYROID STIM HORMONE: CPT

## 2024-12-31 PROCEDURE — 83540 ASSAY OF IRON: CPT

## 2024-12-31 ASSESSMENT — ENCOUNTER SYMPTOMS
VOMITING: 0
NAUSEA: 0
INSOMNIA: 1
WEIGHT LOSS: 0
CHILLS: 0
DIARRHEA: 0
ABDOMINAL PAIN: 1
COUGH: 0
FEVER: 0
NERVOUS/ANXIOUS: 1
SHORTNESS OF BREATH: 0

## 2024-12-31 ASSESSMENT — ANXIETY QUESTIONNAIRES
6. BECOMING EASILY ANNOYED OR IRRITABLE: NOT AT ALL
7. FEELING AFRAID AS IF SOMETHING AWFUL MIGHT HAPPEN: NOT AT ALL
2. NOT BEING ABLE TO STOP OR CONTROL WORRYING: SEVERAL DAYS
1. FEELING NERVOUS, ANXIOUS, OR ON EDGE: MORE THAN HALF THE DAYS
3. WORRYING TOO MUCH ABOUT DIFFERENT THINGS: MORE THAN HALF THE DAYS
5. BEING SO RESTLESS THAT IT IS HARD TO SIT STILL: NOT AT ALL
4. TROUBLE RELAXING: MORE THAN HALF THE DAYS
GAD7 TOTAL SCORE: 7

## 2024-12-31 ASSESSMENT — PATIENT HEALTH QUESTIONNAIRE - PHQ9
5. POOR APPETITE OR OVEREATING: 0 - NOT AT ALL
SUM OF ALL RESPONSES TO PHQ QUESTIONS 1-9: 4
CLINICAL INTERPRETATION OF PHQ2 SCORE: 2

## 2024-12-31 ASSESSMENT — LIFESTYLE VARIABLES: SUBSTANCE_ABUSE: 0

## 2024-12-31 NOTE — PROGRESS NOTES
Subjective:     Chief Complaint   Patient presents with    [facility]    Follow-Up     [facility] visit     HISTORY OF THE PRESENT ILLNESS: Patient is a 39 y.o. female. This pleasant patient is here today to [facility] care and discuss urgent care visit. His/her prior PCP was Dr. Lewis Menendez.    Verbal consent was acquired by the patient to use [employer] ambient listening note generation during this visit Yes.    History of Present Illness  The patient is a 39-year-old female who presents to [facility].    She reports an improvement in her condition, although not complete, with persistent LLQ pain but improved. She completed her antibiotic course last night, augmentin. She was advised to follow a liquid diet for diverticulitis, which she adhered to during Jane. This is her first episode of diverticulitis. She has a history of appendectomy 5 years ago, during which diverticulosis was identified on a scan. She has not undergone a colonoscopy in the past 5 years. She does not report any hematochezia. She experienced a single episode of diarrhea, which she attributes to the antibiotics, but currently reports no diarrhea or constipation.  She does not report any symptoms of pheochromocytoma such as headaches, sweating, or tachycardia.    She has been experiencing low energy levels and anxiety for the past year, which she believes may be due to lack of exercise and poor diet. She transitioned to remote work post-COVID-19, leading to decreased physical activity and weight gain. She has not had recent lab work done. Her last lab results indicated borderline glucose levels. She experienced a period of depression from October to November 2024 but has since resumed gym activities and improved her diet. She reports poor sleep quality and has been taking magnesium supplements, which she suspects may have exacerbated her diverticulitis and also lead to diarrhea while taking. She has been taking 5-HTP for anxiety  for several years and is considering discontinuing it as it doesn't seem to help. She has previously taken citalopram for anxiety but prefers not to use it. She feels better after working out. She has considered counseling for stress management. She walks her dog for exercise.    She does not take tizanidine regularly, only as needed for back spasms. She has mild scoliosis and has been prescribed tizanidine for muscle sprains.  This was prescribed by physiatry.    She has been experiencing facial skin issues, including redness, small pustules, and burning sensation, which have improved since starting the liquid diet. She has consulted a dermatologist and had used topical treatments for suspected rosacea.  States that since she has been on this liquid diet her skin issues have markedly improved.    SOCIAL HISTORY  She does not smoke or use drugs. She drinks alcohol about once a week but plans to cut back to once or twice a month.    FAMILY HISTORY  Her mother has a history of gastrointestinal problems, including diverticulitis, bladder fistula, and had her sigmoid colon removed. Her mother also had an ileostomy. Her mother has diabetes. No family history of colon cancer.    Health Maintenance: Reviewed with patient.  Patient to let me know about who did her last Pap smear. Deferring vaccines today.    Review of Systems   Constitutional:  Negative for chills, fever, malaise/fatigue and weight loss.   Respiratory:  Negative for cough and shortness of breath.    Cardiovascular:  Negative for chest pain.   Gastrointestinal:  Positive for abdominal pain. Negative for diarrhea, nausea and vomiting.   Skin:  Negative for rash.   Psychiatric/Behavioral:  Negative for substance abuse and suicidal ideas. The patient is nervous/anxious and has insomnia.          Objective:     Exam: /64 (BP Location: Right arm, Patient Position: Sitting, BP Cuff Size: Adult)   Pulse 84   Temp 36.7 °C (98 °F) (Temporal)   Resp 16   Ht  "1.651 m (5' 5\")   Wt 68.9 kg (152 lb)   SpO2 97%  Body mass index is 25.29 kg/m².    Physical Exam  Vitals reviewed.   Constitutional:       General: She is not in acute distress.     Appearance: Normal appearance. She is not ill-appearing.   HENT:      Head: Normocephalic and atraumatic.      Mouth/Throat:      Mouth: Mucous membranes are moist.   Eyes:      General: No scleral icterus.  Cardiovascular:      Rate and Rhythm: Normal rate and regular rhythm.      Heart sounds: Normal heart sounds.   Pulmonary:      Effort: Pulmonary effort is normal.      Breath sounds: Normal breath sounds.   Abdominal:      General: Abdomen is flat. Bowel sounds are normal. There is no distension.      Palpations: Abdomen is soft. There is no mass.      Tenderness: There is abdominal tenderness (LLQ). There is no guarding or rebound.   Skin:     General: Skin is warm and dry.      Coloration: Skin is not jaundiced.      Comments: Minimal mid facial erythema, scattered papules under chin.   Neurological:      General: No focal deficit present.      Mental Status: She is alert and oriented to person, place, and time.   Psychiatric:         Attention and Perception: Attention normal.         Mood and Affect: Affect normal. Mood is anxious.         Speech: Speech normal.         Behavior: Behavior normal. Behavior is cooperative.         Thought Content: Thought content normal.       Labs: Reviewed from 8/5/2021  Imaging: Reviewed from 12/23/2024    Depression Screening    Little interest or pleasure in doing things?  1 - several days   Feeling down, depressed , or hopeless? 1 - several days   Trouble falling or staying asleep, or sleeping too much?  2 - more than half the days   Feeling tired or having little energy?  0 - not at all   Poor appetite or overeating?  0 - not at all   Feeling bad about yourself - or that you are a failure or have let yourself or your family down? 0 - not at all   Trouble concentrating on things, such as " reading the newspaper or watching television? 0 - not at all   Moving or speaking so slowly that other people could have noticed.  Or the opposite - being so fidgety or restless that you have been moving around a lot more than usual?  0 - not at all   Thoughts that you would be better off dead, or of hurting yourself?  0 - not at all   Patient Health Questionnaire Score: 4       If depressive symptoms identified deferred to follow up visit unless specifically addressed in assesment and plan.    Interpretation of PHQ-9 Total Score   Score Severity   1-4 No Depression   5-9 Mild Depression   10-14 Moderate Depression   15-19 Moderately Severe Depression   20-27 Severe Depression    JEFFERY-7 Questionnaire    Feeling nervous, anxious, or on edge: More than half the days  Not being able to sop or control worrying: Several days  Worrying too much about different things: More than half the days  Trouble relaxing: More than half the days  Being so restless that it's hard to sit still: Not at all  Becoming easily annoyed or irritable: Not at all  Feeling afraid as if something awful might happen: Not at all  Total: 7    Interpretation of JEFFERY 7 Total Score   Score Severity :  0-4 No Anxiety   5-9 Mild Anxiety  10-14 Moderate Anxiety  15-21 Severe Anxiety      Assessment & Plan:   39 y.o. female with the following -    1. Diverticulitis  2. Diverticulosis  Chronic diverticulosis with acute diverticulitis improved after completion of Augmentin and liquid diet.  Discussed that typically for mild diverticulitis antibiotics are not indicated, reviewed dietary changes used for acute management with liquid diet.  Advised now she can gradually progress from full liquid to low fiber as she can tolerate and then resume normal diet when able.  Discussed importance of high-fiber diet eventually to prevent more diverticuli from forming.  Standard of care is to complete colonoscopy after episode of diverticulitis if not up-to-date within the  past 5 years, referred.  - Referral to GI for Colonoscopy    3. Adrenal nodule (HCC)  4. Abnormal CT of the abdomen  Incidental finding of right adrenal adenoma, indeterminate so we will obtain imaging as below.  Also with a cystic lesion in between the pancreas and duodenum that can be further characterized.  Plan pending results.  - [id number]-L/O; Future    5. Chronic fatigue  Chronic, potentially multifactorial with mood issues and poor sleep contributing.  Patient plans to work on eating healthy and exercising which have been beneficial in the past.  Plan on labs.  - FERRITIN; Future  - IRON/TOTAL IRON BIND; Future  - VITAMIN B12; Future    6. Dysthymia  7. Anxiety  Chronic, seems to be somewhat improved from earlier this year.  Patient declines medication which is reasonable as a trial of healthy diet and exercise might be beneficial.  She is considering counseling and will let me know if she needs a referral.    8. Elevated LDL cholesterol level  Minimal elevation, trend.  - Comp Metabolic Panel; Future  - TSH WITH REFLEX TO FT4; Future  - Lipid Profile; Future  - CBC WITH DIFFERENTIAL; Future    9. Screening for HIV without presence of risk factors  - HIV AG/AB COMBO ASSAY SCREENING; Future    10. Impaired fasting blood sugar  Chronic, encouraged diabetic diet and exercise as tolerated. Trend.  - Comp Metabolic Panel; Future  - HEMOGLOBIN A1C; Future    11. Need for hepatitis C screening test  - HEP C VIRUS ANTIBODY; Future    12. Encounter for vitamin deficiency screening  Requested by patient.  - VITAMIN D,25 HYDROXY (DEFICIENCY); Future    I spent a total of 40 minutes with record review, exam, communication with the patient, and documentation of this encounter.    Return in 8 weeks (on 2/27/2025), or if symptoms worsen or fail to improve, for Annual.    Please note that this dictation was created using voice recognition software. I have made every reasonable attempt to correct obvious errors, but I  expect that there are errors of grammar and possibly content that I did not discover before finalizing the note.

## 2025-02-11 ENCOUNTER — APPOINTMENT (OUTPATIENT)
Dept: MEDICAL GROUP | Facility: LAB | Age: 40
End: 2025-02-11
Payer: COMMERCIAL

## 2025-02-18 ENCOUNTER — HOSPITAL ENCOUNTER (OUTPATIENT)
Dept: RADIOLOGY | Facility: MEDICAL CENTER | Age: 40
End: 2025-02-18
Attending: STUDENT IN AN ORGANIZED HEALTH CARE EDUCATION/TRAINING PROGRAM
Payer: COMMERCIAL

## 2025-02-18 DIAGNOSIS — R93.5 ABNORMAL CT OF THE ABDOMEN: ICD-10-CM

## 2025-02-18 DIAGNOSIS — E27.9 ADRENAL NODULE (HCC): ICD-10-CM

## 2025-02-18 PROCEDURE — 74181 MRI ABDOMEN W/O CONTRAST: CPT

## 2025-02-27 ENCOUNTER — OFFICE VISIT (OUTPATIENT)
Dept: MEDICAL GROUP | Facility: LAB | Age: 40
End: 2025-02-27
Payer: COMMERCIAL

## 2025-02-27 VITALS
BODY MASS INDEX: 24.78 KG/M2 | WEIGHT: 148.7 LBS | RESPIRATION RATE: 16 BRPM | HEART RATE: 76 BPM | HEIGHT: 65 IN | OXYGEN SATURATION: 96 % | TEMPERATURE: 97.8 F | SYSTOLIC BLOOD PRESSURE: 118 MMHG | DIASTOLIC BLOOD PRESSURE: 70 MMHG

## 2025-02-27 DIAGNOSIS — F41.9 ANXIETY: ICD-10-CM

## 2025-02-27 DIAGNOSIS — Z00.00 ENCOUNTER FOR ANNUAL GENERAL MEDICAL EXAMINATION WITHOUT ABNORMAL FINDINGS IN ADULT: ICD-10-CM

## 2025-02-27 DIAGNOSIS — K86.2 PANCREATIC CYST: ICD-10-CM

## 2025-02-27 DIAGNOSIS — Z87.19 HISTORY OF DIVERTICULITIS: ICD-10-CM

## 2025-02-27 DIAGNOSIS — Z23 NEED FOR VACCINATION: ICD-10-CM

## 2025-02-27 DIAGNOSIS — E78.00 ELEVATED LDL CHOLESTEROL LEVEL: ICD-10-CM

## 2025-02-27 DIAGNOSIS — E55.9 VITAMIN D INSUFFICIENCY: ICD-10-CM

## 2025-02-27 DIAGNOSIS — D35.01 ADENOMA OF RIGHT ADRENAL GLAND: ICD-10-CM

## 2025-02-27 PROBLEM — R73.01 IMPAIRED FASTING BLOOD SUGAR: Status: RESOLVED | Noted: 2024-12-31 | Resolved: 2025-02-27

## 2025-02-27 PROBLEM — M62.830 MUSCLE SPASM OF BACK: Status: ACTIVE | Noted: 2025-02-27

## 2025-02-27 ASSESSMENT — FIBROSIS 4 INDEX
FIB4 SCORE: 0.28
FIB4 SCORE: 0.28

## 2025-02-27 ASSESSMENT — ENCOUNTER SYMPTOMS
COUGH: 0
NAUSEA: 0
DIARRHEA: 0
FEVER: 0
SHORTNESS OF BREATH: 0
CHILLS: 0
NERVOUS/ANXIOUS: 1
ABDOMINAL PAIN: 0
VOMITING: 0

## 2025-02-27 ASSESSMENT — PATIENT HEALTH QUESTIONNAIRE - PHQ9
5. POOR APPETITE OR OVEREATING: 0 - NOT AT ALL
SUM OF ALL RESPONSES TO PHQ QUESTIONS 1-9: 2
CLINICAL INTERPRETATION OF PHQ2 SCORE: 1

## 2025-02-27 NOTE — PROGRESS NOTES
Subjective:     Chief Complaint   Patient presents with    Annual Exam    Lab Results       HPI:   Malia Delgado is a 39 y.o. female who presents for annual exam.    Verbal consent was acquired by the patient to use [employer] ambient listening note generation during this visit: YES    History of Present Illness  The patient is a 39-year-old female who presents for her annual exam.    She has been grappling with weight management issues, which she attributes to dietary habits and lack of physical activity. She has made significant improvements in her diet, reducing fast food intake, overeating, and alcohol consumption from 1 to 2 drinks per week to 1 to 2 drinks per month. Her diet now includes more chicken and vegetables. She has lost approximately 10 pounds since her diverticulitis diagnosis and has maintained this weight loss. Despite these changes, she does not perceive herself as overweight.     She has a family history of diverticulitis and plans to resume Metamucil. She recently adhered to a liquid diet due to a perceived twinge in her abdomen. She is uncertain if this sensation is related to her colon or muscles. She reports no diarrhea, constipation, or hematochezia. She has a scheduled appointment with a gastroenterologist on 04/07/2025.    She is up to date with her dental and ophthalmological check-ups.    She has a history of kyphosis and scoliosis, which occasionally cause back spasms. She takes tizanidine as needed for these spasms.    She has been experiencing stress, which she believes is impacting her energy levels. She has been taking multivitamins, which she reports have improved her overall well-being. 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 She walks her dog for exercise and recently used a treadmill, which she found beneficial. She does not wish to take medication for stress management and prefers to explore other coping mechanisms such as exercise and  sunlight exposure. She has previously undergone therapy and is considering finding a new therapist.    Ob-Gyn/ History:    Patient has GYN provider: no  /Para:  G0  History of abnormal pap smears: no    Menses regular lasting 6 days without significant bleeding, cramping varies but improved just to the first 1-2 days.  No pelvic pain or abnormal vaginal discharge.     Current Contraceptive Method: withdrawal. Sexually active: yes. Number of partners in the past year: 1   Dyspareunia: yes.  Urinary incontinence: no  Folate intake: yes    Health Maintenance  Osteoporosis Screen/ DEXA: Due at 67  Diet/exercise: Reviewed.   Cholesterol Screening:   Lab Results   Component Value Date     (H) 2024   Diabetes Screening:   Lab Results   Component Value Date    HBA1C 5.0 2024   Aspirin Use: Not indicated     Cancer screening  Colorectal Cancer Screening: Referred (hx of diverticulitis)  Lung Cancer Screening: Not indicated  Cervical Cancer Screening: Due-patient prefers to establish with new gynecologist for Pap smear  Breast Cancer Screening: Due at 40.     Infectious disease screening/Immunizations  --Immunizations: Reviewed with patient.   --Hepatitis C Screening: Complete/negative prior  --HIV Screening: Complete/negative prior. Risk factors: no    She  has a past medical history of Anxiety, Hepatomegaly (2021), Impaired fasting blood sugar (2024), and Ovarian cyst (2021).    She has no past medical history of Hypertension.  She  has a past surgical history that includes pr lap,appendectomy (2019) and appendectomy.    Family History   Problem Relation Age of Onset    Diabetes Mother     Arthritis Mother     Hypertension Mother     Genetic Disorder Mother         Scoliosis    Hyperlipidemia Mother     Other Mother         diverticulitis    Alcohol abuse Father     No Known Problems Sister     Cancer Maternal Grandmother         Brain Cancer    Heart Disease Maternal  Grandmother         A-fib    Breast Cancer Paternal Grandmother     Colon Cancer Neg Hx        Social History     Socioeconomic History    Marital status: Single     Spouse name: Not on file    Number of children: 0    Years of education: Not on file    Highest education level: Associate degree: occupational, technical, or vocational program   Occupational History    Not on file   Tobacco Use    Smoking status: Never    Smokeless tobacco: Never   Vaping Use    Vaping status: Never Used   Substance and Sexual Activity    Alcohol use: Yes     Alcohol/week: 0.6 - 1.2 oz     Types: 1 - 2 Standard drinks or equivalent per week    Drug use: Never    Sexual activity: Yes     Partners: Male     Birth control/protection: Coitus Interruptus   Other Topics Concern    Not on file   Social History Narrative    Not on file     Social Drivers of Health     Financial Resource Strain: Low Risk  (12/30/2024)    Overall Financial Resource Strain (CARDIA)     Difficulty of Paying Living Expenses: Not hard at all   Food Insecurity: No Food Insecurity (12/30/2024)    Hunger Vital Sign     Worried About Running Out of Food in the Last Year: Never true     Ran Out of Food in the Last Year: Never true   Transportation Needs: No Transportation Needs (12/30/2024)    PRAPARE - Transportation     Lack of Transportation (Medical): No     Lack of Transportation (Non-Medical): No   Physical Activity: Insufficiently Active (12/30/2024)    Exercise Vital Sign     Days of Exercise per Week: 7 days     Minutes of Exercise per Session: 20 min   Stress: Stress Concern Present (12/30/2024)    [employer] [place] [employer] - Occupational Stress Questionnaire     Feeling of Stress : Rather much   Social Connections: Moderately Isolated (12/30/2024)    Social Connection and Isolation Panel [NHANES]     Frequency of Communication with Friends and Family: More than three times a week     Frequency of Social Gatherings with Friends and Family: Once  "a week     Attends Alevism Services: Never     Active Member of Clubs or Organizations: No     Attends Club or Organization Meetings: Never     Marital Status: Living with partner   Intimate Partner Violence: Not on file   Housing Stability: Low Risk  (12/30/2024)    Housing Stability Vital Sign     Unable to Pay for Housing in the Last Year: No     Number of Times Moved in the Last Year: 0     Homeless in the Last Year: No       Patient Active Problem List    Diagnosis Date Noted    Vitamin D insufficiency 02/27/2025    Muscle spasm of back 02/27/2025    Elevated LDL cholesterol level 12/31/2024    Adenoma of right adrenal gland 12/31/2024    Pancreatic cyst 12/31/2024    Chronic fatigue 12/31/2024    Rosacea 12/31/2024    Anxiety 12/31/2024    Dysthymia 12/31/2024    Diverticulosis 08/04/2021         Current Outpatient Medications   Medication Sig Dispense Refill    tizanidine (ZANAFLEX) 4 MG Tab TAKE 1 TO 2 TABLETS BY MOUTH AT BEDTIME      5-Hydroxytryptophan (5-HTP) 100 MG Cap Take  by mouth.      cetirizine (ZYRTEC) 10 MG Tab Take 10 mg by mouth every day.      Melatonin 1 MG Cap Take  by mouth.      ibuprofen (MOTRIN) 200 MG Tab Take 200 mg by mouth every 6 hours as needed.       No current facility-administered medications for this visit.     No Known Allergies    Review of Systems   Constitutional:  Positive for malaise/fatigue. Negative for chills and fever.   Respiratory:  Negative for cough and shortness of breath.    Cardiovascular:  Negative for chest pain.   Gastrointestinal:  Negative for abdominal pain, diarrhea, nausea and vomiting.   Skin:  Negative for rash.   Psychiatric/Behavioral:  The patient is nervous/anxious.         Objective:     /70 (BP Location: Right arm, Patient Position: Sitting, BP Cuff Size: Adult)   Pulse 76   Temp 36.6 °C (97.8 °F) (Temporal)   Resp 16   Ht 1.651 m (5' 5\")   Wt 67.4 kg (148 lb 11.2 oz)   SpO2 96%   BMI 24.74 kg/m²   Body mass index is 24.74 " kg/m².  Wt Readings from Last 4 Encounters:   02/27/25 67.4 kg (148 lb 11.2 oz)   12/31/24 68.9 kg (152 lb)   12/23/24 70.8 kg (156 lb)   06/03/22 71.1 kg (156 lb 12.8 oz)       Physical Exam  Vitals reviewed.   Constitutional:       General: She is not in acute distress.     Appearance: Normal appearance. She is not ill-appearing.   HENT:      Head: Normocephalic and atraumatic.      Right Ear: Tympanic membrane, ear canal and external ear normal.      Left Ear: Tympanic membrane, ear canal and external ear normal.      Nose: Nose normal.      Mouth/Throat:      Mouth: Mucous membranes are moist.      Pharynx: No oropharyngeal exudate or posterior oropharyngeal erythema.      Comments: Dentition good  Eyes:      General: No scleral icterus.     Conjunctiva/sclera: Conjunctivae normal.   Cardiovascular:      Rate and Rhythm: Normal rate and regular rhythm.      Heart sounds: Normal heart sounds. No murmur heard.  Pulmonary:      Effort: Pulmonary effort is normal. No respiratory distress.      Breath sounds: Normal breath sounds. No wheezing, rhonchi or rales.   Abdominal:      General: Abdomen is flat. Bowel sounds are normal. There is no distension.      Palpations: Abdomen is soft. There is no mass.      Tenderness: There is abdominal tenderness (minimal LLQ). There is no guarding or rebound.   Musculoskeletal:      Cervical back: Normal range of motion and neck supple. No rigidity or tenderness.      Right lower leg: No edema.      Left lower leg: No edema.   Lymphadenopathy:      Cervical: No cervical adenopathy.   Skin:     General: Skin is warm and dry.      Findings: No rash.   Neurological:      General: No focal deficit present.      Mental Status: She is alert and oriented to person, place, and time.      Gait: Gait normal.   Psychiatric:         Attention and Perception: Attention normal.         Mood and Affect: Affect normal. Mood is anxious.         Speech: Speech normal.         Behavior: Behavior  normal. Behavior is cooperative.         Thought Content: Thought content normal.       Labs: Reviewed from 12/23/2024  Imaging: Reviewed from 2/18/2025    Depression Screening    Little interest or pleasure in doing things?  0 - not at all   Feeling down, depressed , or hopeless? 1 - several days   Trouble falling or staying asleep, or sleeping too much?  0 - not at all   Feeling tired or having little energy?  1 - several days   Poor appetite or overeating?  0 - not at all   Feeling bad about yourself - or that you are a failure or have let yourself or your family down? 0 - not at all   Trouble concentrating on things, such as reading the newspaper or watching television? 0 - not at all   Moving or speaking so slowly that other people could have noticed.  Or the opposite - being so fidgety or restless that you have been moving around a lot more than usual?  0 - not at all   Thoughts that you would be better off dead, or of hurting yourself?  0 - not at all   Patient Health Questionnaire Score: 2     If depressive symptoms identified deferred to follow up visit unless specifically addressed in assesment and plan.    Interpretation of PHQ-9 Total Score   Score Severity   1-4 No Depression   5-9 Mild Depression   10-14 Moderate Depression   15-19 Moderately Severe Depression   20-27 Severe Depression    Assessment and Plan:     1. Encounter for annual general medical examination without abnormal findings in adult  HCM: Reviewed with patient as above.  Immunizations discussed/recommended and patient directed to the pharmacy if vaccines not available in clinic.  Age-appropriate anticipatory guidance discussed: dental visits, healthy diet/exercise, and follow up for mood concerns.    2. Need for vaccination  - Tdap =>8yo IM    3. Elevated LDL cholesterol level  Chronic, not at threshold for statin. Discussed ideal ranges and ways to improve with lifestyle choices.    4. Vitamin D insufficiency  Her vitamin D levels are  slightly low. She is advised to continue taking her multivitamin, which contains 1000 international units of vitamin D3.    5. Adenoma of right adrenal gland  Incidental finding, confirmed to be an adenoma on MRI without suspicious features.  No symptoms/signs suggest that this is a hormone secreting adenoma.  Suspect this will not need to be further monitored, will confirm with radiologist.    6. Pancreatic cyst  Chronic after first discovery in December of last year during episode of diverticulitis, stable in size and benign appearing simple 2 cm cyst without pancreatic dilatation.  Based on these findings no further surveillance required, will confirm with radiology.    7. History of diverticulitis  She has a history of diverticulitis and is scheduled for a consultation on April 7 to discuss the need for a colonoscopy.  With symptoms suggestive of development of diverticulitis patient is aware to initiate a liquid diet until improved.  Otherwise would benefit from increasing the fiber in her diet to prevent more diverticuli from forming.  Aware of return precautions.     8. Anxiety  Chronic. She reports that her stress levels fluctuate and prefers not to take medication for it. She is encouraged to consider therapy to help manage her anxiety and stress-declines need for referral at this time.    Follow-up: Return in about 1 year (around 2/27/2026), or if symptoms worsen or fail to improve, for Annual.

## 2025-02-27 NOTE — PATIENT INSTRUCTIONS
Aim for goal of 150 minutes a week of moderate intensity exercise (ex 30 minutes 5 times a week)    Vitamin D3 1000IU daily

## 2025-02-28 ENCOUNTER — RESULTS FOLLOW-UP (OUTPATIENT)
Dept: MEDICAL GROUP | Facility: LAB | Age: 40
End: 2025-02-28

## (undated) DEVICE — Device

## (undated) DEVICE — CHLORAPREP 26 ML APPLICATOR - ORANGE TINT(25/CA)

## (undated) DEVICE — STAPLER 45MM ARTICULATING - ENDO (3EA/BX)

## (undated) DEVICE — GLOVE BIOGEL INDICATOR SZ 7SURGICAL PF LTX - (50/BX 4BX/CA)

## (undated) DEVICE — SODIUM CHL IRRIGATION 0.9% 1000ML (12EA/CA)

## (undated) DEVICE — HEAD HOLDER JUNIOR/ADULT

## (undated) DEVICE — DERMABOND ADVANCED - (12EA/BX)

## (undated) DEVICE — SUCTION INSTRUMENT YANKAUER BULBOUS TIP W/O VENT (50EA/CA)

## (undated) DEVICE — CANISTER SUCTION RIGID RED 1500CC (40EA/CA)

## (undated) DEVICE — BAG RETRIEVAL 10ML (10EA/BX)

## (undated) DEVICE — CANNULA W/SEAL 5X100 Z-THRE - ADED KII (12/BX)

## (undated) DEVICE — GOWN WARMING STANDARD FLEX - (30/CA)

## (undated) DEVICE — BAG, SPONGE COUNT 50600

## (undated) DEVICE — SEALER ARTICULATING TISSUE NSEAL (6/BX)

## (undated) DEVICE — ELECTRODE DUAL RETURN W/ CORD - (50/PK)

## (undated) DEVICE — MASK ANESTHESIA ADULT  - (100/CA)

## (undated) DEVICE — SUTURE 0 PDS-2 CTX 36 INCH - (24/BX)

## (undated) DEVICE — TROCAR Z THREAD12MM OPTICAL - NON BLADED (6/BX)

## (undated) DEVICE — KIT ANESTHESIA W/CIRCUIT & 3/LT BAG W/FILTER (20EA/CA)

## (undated) DEVICE — LACTATED RINGERS INJ 1000 ML - (14EA/CA 60CA/PF)

## (undated) DEVICE — GLOVE BIOGEL SZ 7 SURGICAL PF LTX - (50PR/BX 4BX/CA)

## (undated) DEVICE — SUTURE GENERAL

## (undated) DEVICE — TUBING INSUFFLATION - (10/BX)

## (undated) DEVICE — TUBE CONNECTING SUCTION - CLEAR PLASTIC STERILE 72 IN (50EA/CA)

## (undated) DEVICE — PROTECTOR ULNA NERVE - (36PR/CA)

## (undated) DEVICE — NEPTUNE 4 PORT MANIFOLD - (20/PK)

## (undated) DEVICE — ADHESIVE DERMABOND HVD MINI (12EA/BX)

## (undated) DEVICE — TROCAR 5X100 NON BLADED Z-TH - READ KII (6/BX)

## (undated) DEVICE — WATER IRRIGATION STERILE 1000ML (12EA/CA)

## (undated) DEVICE — ELECTRODE 5MM LHK LAPSCP STERILE DISP- MEGADYNE  (5/CA)

## (undated) DEVICE — KIT ROOM DECONTAMINATION

## (undated) DEVICE — SUTURE 4-0 MONOCRYL PLUS PS-2 - 27 INCH (36/BX)

## (undated) DEVICE — ELECTRODE 850 FOAM ADHESIVE - HYDROGEL RADIOTRNSPRNT (50/PK)

## (undated) DEVICE — GLOVE, LITE (PAIR)

## (undated) DEVICE — HUMID-VENT HEAT AND MOISTURE EXCHANGE- (50/BX)

## (undated) DEVICE — SENSOR SPO2 NEO LNCS ADHESIVE (20/BX) SEE USER NOTES